# Patient Record
Sex: FEMALE | NOT HISPANIC OR LATINO | Employment: OTHER | URBAN - METROPOLITAN AREA
[De-identification: names, ages, dates, MRNs, and addresses within clinical notes are randomized per-mention and may not be internally consistent; named-entity substitution may affect disease eponyms.]

---

## 2019-09-23 NOTE — PROGRESS NOTES
Electrophysiology Clinic Consult     Mona Cordova  1950  [unfilled]  [unfilled]    09/25/19    DATE OF ADMISSION: (Not on file)  [facility] CARDIOLOGY    Simeon Serrano MD  [address] / [place] [address]    Chief Complaint   Patient presents with   • Chest Pain     Problem List:  1. Tachycardia (Atrial flutter vs SVT)  a. Echocardiogram 8/29/18: EF 60-65%, trace MR, mild TR  b. Stress test 8/29/18: No evidence of ischemia, EF 61%  c. [employer] loop recorder implant, 10/2018, Dr. Mckeon  d. Atrial flutter vs SVT detected on loop recorder interrogation 08/2019  e. CHADSVASc = 5 (female, age, hx of CVA, HTN), on Eliquis  2. Hypertension  3. Hyperlipidemia  4. Hypothyroidism  5. Hx of  Multiple CVA- first in 2017 with history TPA  (Cryptogenic Stroke) - with deficits of memory impairment and left sided weakness.   6. GERD  7. Asthma  8. Negative sleep study  9. Surgical history  a. Hysterectomy  b. Mastectomy  c. Gastric sleeve    History of Present Illness:  Patient is a 69 year old female who presents today in consultation by referral from Dr. Khan for further evaluation and management of atrial flutter.  The patient underwent an ILR in 2018 secondary to history of cryptogenic stroke.  When device was checked recently in Dr. Khan's office, there was concern for possible new onset atrial flutter on loop recorder interrogation.  Patient is mildly to moderately symptomatic with c/o regular palpitations, symptoms exacerbated by laying on left side and relieved by nothing in particular.  Symptoms occur at random times and usually last hours in duration. She has history of multiple cryptogenic CVAs and was placed on Plavix and ASA. Since diagnosis of atrial fibrillation/flutter, she has been on Eliquis without further CVA symptoms and/or bleeding. Her BP was not well controlled but medications were recently adjusted and it is now doing better. She has history of negative  sleep study. Thyroid is well controlled on replacement medication. No tobacco. She drinks wine or vodka on occasion. She drinks 3-4 cups of coffee daily.       Allergies   Allergen Reactions   • Morphine Rash and GI Intolerance        Cannot display prior to admission medications because the patient has not been admitted in this contact.            Current Outpatient Medications:   •  apixaban (ELIQUIS) 5 MG tablet tablet, Take 5 mg by mouth 2 (Two) Times a Day., Disp: , Rfl:   •  CALCIUM PO, Take 600 mg by mouth Daily., Disp: , Rfl:   •  cetirizine (zyrTEC) 10 MG tablet, Take 10 mg by mouth Daily., Disp: , Rfl:   •  Cyanocobalamin (VITAMIN B-12 PO), Take 1 tablet by mouth 2 (Two) Times a Day., Disp: , Rfl:   •  hydroCHLOROthiazide (HYDRODIURIL) 25 MG tablet, Take 25 mg by mouth Daily., Disp: , Rfl:   •  levothyroxine (SYNTHROID, LEVOTHROID) 100 MCG tablet, Take 100 mcg by mouth Daily., Disp: , Rfl:   •  metoprolol tartrate (LOPRESSOR) 100 MG tablet, Take 100 mg by mouth 2 (Two) Times a Day., Disp: , Rfl:   •  Multiple Vitamin (MULTI-VITAMIN PO), Take 1 tablet by mouth Daily. Centrum silver, Disp: , Rfl:   •  Multiple Vitamin (MULTI-VITAMIN PO), Take 1 tablet by mouth Daily. ICAPS VITAMIN, Disp: , Rfl:   •  pantoprazole (PROTONIX) 40 MG EC tablet, Take 40 mg by mouth 2 (Two) Times a Day., Disp: , Rfl:   •  QUEtiapine (SEROquel) 300 MG tablet, Take 300 mg by mouth Daily., Disp: , Rfl:   •  raloxifene (EVISTA) 60 MG tablet, Take 60 mg by mouth Daily., Disp: , Rfl:   •  raNITIdine (ZANTAC) 150 MG tablet, Take 150 mg by mouth Daily., Disp: , Rfl:     Social History     Socioeconomic History   • Marital status:      Spouse name: Not on file   • Number of children: Not on file   • Years of education: Not on file   • Highest education level: Not on file   Tobacco Use   • Smoking status: Never Smoker   • Smokeless tobacco: Never Used   Substance and Sexual Activity   • Alcohol use: Yes     Comment: maria isabel   • Drug  "use: No       Family History   Problem Relation Age of Onset   • Emphysema Mother    Father's sister: history of CAD with CABG  Father:  age 44, suicide  No siblings with heart disease.     REVIEW OF SYSTEMS:   CONST:  No weight loss, fever, chills, weakness + fatigue.   HEENT:  No visual loss, blurred vision, double vision, yellow sclerae.                   No hearing loss, congestion, sore throat.   SKIN:      No rashes, urticaria, ulcers, sores.     RESP:     No shortness of breath, hemoptysis, cough, sputum.   GI:           No anorexia, nausea, vomiting, diarrhea. No abdominal pain, melena.   :         No burning on urination, hematuria or increased frequency.  ENDO:    No diaphoresis, cold or heat intolerance. No polyuria or polydipsia.   NEURO:  No headache, dizziness, syncope, paralysis, ataxia, or parasthesias.                  No change in bowel or bladder control. + history of CVA/TIA  MUSC:    No muscle, back pain, joint pain or stiffness.   HEME:    No anemia, bleeding, bruising. No history of DVT/PE.  PSYCH:  No history of depression, anxiety    Vitals:    19 1037   BP: 122/86   BP Location: Right arm   Patient Position: Sitting   Pulse: 85   SpO2: 98%   Weight: 81.6 kg (180 lb)   Height: 175.3 cm (69\")                 Physical Exam:  GEN: Well nourished, well-developed, no acute distress  HEENT: Normocephalic, atraumatic, PERRLA, moist mucous membranes  NECK: Supple, NO JVD, no thyromegaly, no lymphadenopathy  CARD: S1S2, RRR, no murmur, gallop, rub, PMI NL  LUNGS: Clear to auscultation, normal respiratory effort  ABDOMEN: Soft, nontender, normal bowel sounds  EXTREMITIES: No gross deformities, no clubbing, cyanosis, or edema  SKIN: Warm, dry, No lesions  NEURO: No focal deficits, alert and oriented x 3  PSYCHIATRIC: Normal affect and mood      I personally viewed and interpreted the patient's EKG/Telemetry/lab data    No results found for: GLUCOSE, CALCIUM, NA, K, CO2, CL, BUN, CREATININE, " EGFRIFAFRI, EGFRIFNONA, BCR, ANIONGAP  No results found for: WBC, HGB, HCT, MCV, PLT  No results found for: INR, PROTIME  No results found for: TSH, Q7BTGBN, Q5STOIX, THYROIDAB    Loop recorder interrogation from Dr. Khan's office: tachycardia noted (SVT vs atrial flutter 150 bpm)  Loop recorder interrogation in our office today: no arrhythmias. Changed tachy rate to 120 bpm.       ECG 12 Lead  Date/Time: 9/25/2019 11:20 AM  Performed by: Anup Garcia MD  Authorized by: Anup Garcia MD   Previous ECG: no previous ECG available  Rhythm: sinus rhythm  BPM: 85                ICD-10-CM ICD-9-CM   1. Atrial flutter, unspecified type (CMS/HCC) I48.92 427.32   2. Essential hypertension I10 401.9   3. History of CVA (cerebrovascular accident) Z86.73 V12.54       Assessment and Plan:   1. Atrial flutter vs SVT:  - noted on device interrogation. In setting of cryptogenic CVA history, agree with Eliquis in place of Plavix/ASA. CHADSVasc 5.   - There is suspicion that she may be having Atrial flutter vs SVT causing her symptoms and possibly a cause of her CVA. However, the frequency is very low according to device interrogation. On today's interrogation, there were no arrhythmias. We did make an adjustment on her loop recorder to change tachy rate to 120 bpm. If there appears to be a large amount of arrhythmias, would recommend EPS +/- RFA of Atrial flutter vs SVT. Will monitor loop recorder over the next 3 months.      2. HTN:  - controlled on current medications    3. History of CVAs:  - no recurrences on Eliquis      Follow up in 2 months in [place]    Scribed for Anup Garcia MD by Lakshmi Rossi PA-C. 9/25/2019  11:21 AM     Anup DEJESUS MD, personally performed the services described in this documentation as scribed by the above named individual in my presence, and it is both accurate and complete.  9/25/2019  11:25 AM

## 2019-09-24 PROBLEM — I10 ESSENTIAL HYPERTENSION: Status: ACTIVE | Noted: 2019-09-24

## 2019-09-24 PROBLEM — E78.5 HYPERLIPIDEMIA: Status: ACTIVE | Noted: 2019-09-24

## 2019-09-24 PROBLEM — Z86.73 HISTORY OF CVA (CEREBROVASCULAR ACCIDENT): Status: ACTIVE | Noted: 2019-09-24

## 2019-09-24 PROBLEM — E03.9 HYPOTHYROIDISM: Status: ACTIVE | Noted: 2019-09-24

## 2019-09-24 PROBLEM — I48.92 ATRIAL FLUTTER (HCC): Status: ACTIVE | Noted: 2019-09-24

## 2019-09-25 ENCOUNTER — CONSULT (OUTPATIENT)
Dept: CARDIOLOGY | Facility: CLINIC | Age: 69
End: 2019-09-25

## 2019-09-25 VITALS
DIASTOLIC BLOOD PRESSURE: 86 MMHG | SYSTOLIC BLOOD PRESSURE: 122 MMHG | WEIGHT: 180 LBS | HEIGHT: 69 IN | OXYGEN SATURATION: 98 % | HEART RATE: 85 BPM | BODY MASS INDEX: 26.66 KG/M2

## 2019-09-25 DIAGNOSIS — Z86.73 HISTORY OF CVA (CEREBROVASCULAR ACCIDENT): ICD-10-CM

## 2019-09-25 DIAGNOSIS — I10 ESSENTIAL HYPERTENSION: ICD-10-CM

## 2019-09-25 DIAGNOSIS — I48.92 ATRIAL FLUTTER, UNSPECIFIED TYPE (HCC): Primary | ICD-10-CM

## 2019-09-25 PROCEDURE — 99204 OFFICE O/P NEW MOD 45 MIN: CPT | Performed by: INTERNAL MEDICINE

## 2019-09-25 PROCEDURE — 93291 INTERROG DEV EVAL SCRMS IP: CPT | Performed by: INTERNAL MEDICINE

## 2019-09-25 RX ORDER — PANTOPRAZOLE SODIUM 40 MG/1
40 TABLET, DELAYED RELEASE ORAL 2 TIMES DAILY
COMMUNITY

## 2019-09-25 RX ORDER — RANITIDINE 150 MG/1
150 TABLET ORAL DAILY
COMMUNITY
Start: 2019-08-08 | End: 2020-06-03

## 2019-09-25 RX ORDER — HYDROCHLOROTHIAZIDE 25 MG/1
25 TABLET ORAL DAILY
COMMUNITY
End: 2020-01-02

## 2019-09-25 RX ORDER — CETIRIZINE HYDROCHLORIDE 10 MG/1
10 TABLET ORAL DAILY
COMMUNITY

## 2019-09-25 RX ORDER — METOPROLOL TARTRATE 50 MG/1
50 TABLET, FILM COATED ORAL 2 TIMES DAILY
COMMUNITY
End: 2020-04-10 | Stop reason: SDUPTHER

## 2019-09-25 RX ORDER — RALOXIFENE HYDROCHLORIDE 60 MG/1
60 TABLET, FILM COATED ORAL DAILY
COMMUNITY
Start: 2019-09-09

## 2019-09-25 RX ORDER — LEVOTHYROXINE SODIUM 137 UG/1
137 CAPSULE ORAL DAILY
COMMUNITY
Start: 2019-09-21

## 2019-09-25 RX ORDER — QUETIAPINE FUMARATE 200 MG/1
200 TABLET, FILM COATED ORAL NIGHTLY
COMMUNITY
Start: 2019-07-27

## 2020-01-02 ENCOUNTER — OFFICE VISIT (OUTPATIENT)
Dept: CARDIOLOGY | Facility: CLINIC | Age: 70
End: 2020-01-02

## 2020-01-02 VITALS
SYSTOLIC BLOOD PRESSURE: 123 MMHG | HEIGHT: 69 IN | DIASTOLIC BLOOD PRESSURE: 85 MMHG | BODY MASS INDEX: 27.52 KG/M2 | HEART RATE: 87 BPM | WEIGHT: 185.8 LBS | OXYGEN SATURATION: 98 %

## 2020-01-02 DIAGNOSIS — R07.2 PRECORDIAL CHEST PAIN: ICD-10-CM

## 2020-01-02 DIAGNOSIS — I48.92 ATRIAL FLUTTER, UNSPECIFIED TYPE (HCC): ICD-10-CM

## 2020-01-02 DIAGNOSIS — I10 ESSENTIAL HYPERTENSION: ICD-10-CM

## 2020-01-02 DIAGNOSIS — I48.0 PAROXYSMAL ATRIAL FIBRILLATION (HCC): Primary | ICD-10-CM

## 2020-01-02 DIAGNOSIS — E78.2 MIXED HYPERLIPIDEMIA: ICD-10-CM

## 2020-01-02 DIAGNOSIS — Z95.818 OTHER SPECIFIED CARDIAC DEVICE IN SITU: ICD-10-CM

## 2020-01-02 PROCEDURE — 99214 OFFICE O/P EST MOD 30 MIN: CPT | Performed by: SPECIALIST

## 2020-01-02 RX ORDER — VILAZODONE HYDROCHLORIDE 40 MG/1
40 TABLET ORAL DAILY
COMMUNITY

## 2020-01-02 RX ORDER — RIFAXIMIN 550 MG/1
550 TABLET ORAL AS NEEDED
COMMUNITY
Start: 2019-11-19

## 2020-01-02 RX ORDER — SUCRALFATE 1 G/1
1 TABLET ORAL 2 TIMES DAILY
COMMUNITY

## 2020-01-02 RX ORDER — LOSARTAN POTASSIUM AND HYDROCHLOROTHIAZIDE 25; 100 MG/1; MG/1
1 TABLET ORAL DAILY
COMMUNITY
Start: 2020-01-01 | End: 2020-04-10 | Stop reason: SDUPTHER

## 2020-01-02 NOTE — PATIENT INSTRUCTIONS
Atrial Fibrillation  Atrial fibrillation is a type of irregular or rapid heartbeat (arrhythmia). In atrial fibrillation, the top part of the heart (atria) quivers in a chaotic pattern. This makes the heart unable to pump blood normally. Having atrial fibrillation can increase your risk for other health problems, such as:  · Blood can pool in the atria and form clots. If a clot travels to the brain, it can cause a stroke.  · The heart muscle may weaken from the irregular blood flow. This can cause heart failure.  Atrial fibrillation may start suddenly and stop on its own, or it may become a long-lasting problem.  What are the causes?  This condition is caused by some heart-related conditions or procedures, including:  · High blood pressure. This is the most common cause.  · Heart failure.  · Heart valve conditions.  · Inflammation of the sac that surrounds the heart (pericarditis).  · Heart surgery.  · Coronary artery disease.  · Certain heart rhythm disorders, such as Ruffin-Parkinson-White syndrome.  Other causes include:  · Pneumonia.  · Obstructive sleep apnea.  · Lung cancer.  · Thyroid problems, especially if the thyroid is overactive (hyperthyroidism).  · Excessive alcohol or drug use.  Sometimes, the cause of this condition is not known.  What increases the risk?  This condition is more likely to develop in:  · Older people.  · People who smoke.  · People who have diabetes mellitus.  · People who are overweight (obese).  · Athletes who exercise vigorously.  · People who have a family history.  What are the signs or symptoms?  Symptoms of this condition include:  · A feeling that your heart is beating rapidly or irregularly.  · A feeling of discomfort or pain in your chest.  · Shortness of breath.  · Sudden light-headedness or weakness.  · Getting tired easily during exercise.  In some cases, there are no symptoms.  How is this diagnosed?  Your health care provider may be able to detect atrial fibrillation when  taking your pulse. If detected, this condition may be diagnosed with:  · Electrocardiogram (ECG).  · Ambulatory cardiac monitor. This device records your heartbeats for 24 hours or more.  · Transthoracic echocardiogram (TTE) to evaluate how blood flows through your heart.  · Transesophageal echocardiogram (CHANG) to view more detailed images of your heart.  · A stress test.  · Imaging tests, such as a CT scan or chest X-ray.  · Blood tests.  How is this treated?  This condition may be treated with:  · Medicines to slow down the heart rate or bring the heart's rhythm back to normal.  · Medicines to prevent blood clots from forming.  · Electrical cardioversion. This delivers a low-energy shock to the heart to reset its rhythm.  · Ablation. This procedure destroys the part of the heart tissue that sends abnormal signals.  · Left atrial appendage occlusion/excision. This seals off a common place in the atria where blood clots can form (left atrial appendage).  The goal of treatment is to prevent blood clots from forming and to keep your heart beating at a normal rate and rhythm. Treatment depends on underlying medical conditions and how you feel when you are experiencing fibrillation.  Follow these instructions at home:  Medicines  · Take over-the counter and prescription medicines only as told by your health care provider.  · If your health care provider prescribed a blood-thinning medicine (anticoagulant), take it exactly as told. Taking too much blood-thinning medicine can cause bleeding. Taking too little can enable a blood clot to form and travel to the brain, causing a stroke.  Lifestyle         · Do not use any products that contain nicotine or tobacco, such as cigarettes and e-cigarettes. If you need help quitting, ask your health care provider.  · Do not drink beverages that contain caffeine, such as coffee, soda, and tea.  · Follow diet instructions as told by your health care provider.  · Exercise regularly as  "told by your health care provider.  · Do not drink alcohol.  General instructions  · If you have obstructive sleep apnea, manage your condition as told by your health care provider.  · Maintain a healthy weight. Do not use diet pills unless your health care provider approves. Diet pills may make heart problems worse.  · Keep all follow-up visits as told by your health care provider. This is important.  Contact a health care provider if you:  · Notice a change in the rate, rhythm, or strength of your heartbeat.  · Are taking an anticoagulant and you notice increased bruising.  · Tire more easily when you exercise or exert yourself.  · Have a sudden change in weight.  Get help right away if you have:    · Chest pain, abdominal pain, sweating, or weakness.  · Difficulty breathing.  · Blood in your vomit, stool (feces), or urine.  · Any symptoms of a stroke. \"BE FAST\" is an easy way to remember the main warning signs of a stroke:  ? B - Balance. Signs are dizziness, sudden trouble walking, or loss of balance.  ? E - Eyes. Signs are trouble seeing or a sudden change in vision.  ? F - Face. Signs are sudden weakness or numbness of the face, or the face or eyelid drooping on one side.  ? A - Arms. Signs are weakness or numbness in an arm. This happens suddenly and usually on one side of the body.  ? S - Speech. Signs are sudden trouble speaking, slurred speech, or trouble understanding what people say.  ? T - Time. Time to call emergency services. Write down what time symptoms started.  · Other signs of a stroke, such as:  ? A sudden, severe headache with no known cause.  ? Nausea or vomiting.  ? Seizure.  These symptoms may represent a serious problem that is an emergency. Do not wait to see if the symptoms will go away. Get medical help right away. Call your local emergency services (911 in the [place] Do not drive yourself to the hospital.  Summary  · Atrial fibrillation is a type of irregular or rapid heartbeat " "(arrhythmia).  · Symptoms include a feeling that your heart is beating fast or irregularly. In some cases, you may not have symptoms.  · The condition is treated with medicines to slow down the heart rate or bring the heart's rhythm back to normal. You may also need blood-thinning medicines to prevent blood clots.  · Get help right away if you have symptoms or signs of a stroke.  This information is not intended to replace advice given to you by your health care provider. Make sure you discuss any questions you have with your health care provider.  Document Released: 12/18/2006 Document Revised: 02/08/2019 Document Reviewed: 02/08/2019  [employer] Interactive Patient Education © 2019 [employer]  BMI for Adults    Body mass index (BMI) is a number that is calculated from a person's weight and height. BMI may help to estimate how much of a person's weight is composed of fat. BMI can help identify those who may be at higher risk for certain medical problems.  How is BMI used with adults?  BMI is used as a screening tool to identify possible weight problems. It is used to check whether a person is obese, overweight, healthy weight, or underweight.  How is BMI calculated?  BMI measures your weight and compares it to your height. This can be done either in English ([place] or metric measurements. Note that charts are available to help you find your BMI quickly and easily without having to do these calculations yourself.  To calculate your BMI in English ([place] measurements, your health care provider will:  1. Measure your weight in pounds (lb).  2. Multiply the number of pounds by 703.  ? For example, for a person who weighs 180 lb, multiply that number by 703, which equals 126,540.  3. Measure your height in inches (in). Then multiply that number by itself to get a measurement called \"inches squared.\"  ? For example, for a person who is 70 in tall, the \"inches squared\" measurement is 70 in x 70 in, which equals 4900 " "inches squared.  4. Divide the total from Step 2 (number of lb x 703) by the total from Step 3 (inches squared): 126,540 ÷ 4900 = 25.8. This is your BMI.  To calculate your BMI in metric measurements, your health care provider will:  1. Measure your weight in kilograms (kg).  2. Measure your height in meters (m). Then multiply that number by itself to get a measurement called \"meters squared.\"  ? For example, for a person who is 1.75 m tall, the \"meters squared\" measurement is 1.75 m x 1.75 m, which is equal to 3.1 meters squared.  3. Divide the number of kilograms (your weight) by the meters squared number. In this example: 70 ÷ 3.1 = 22.6. This is your BMI.  How is BMI interpreted?  To interpret your results, your health care provider will use BMI charts to identify whether you are underweight, normal weight, overweight, or obese. The following guidelines will be used:  · Underweight: BMI less than 18.5.  · Normal weight: BMI between 18.5 and 24.9.  · Overweight: BMI between 25 and 29.9.  · Obese: BMI of 30 and above.  Please note:  · Weight includes both fat and muscle, so someone with a muscular build, such as an athlete, may have a BMI that is higher than 24.9. In cases like these, BMI is not an accurate measure of body fat.  · To determine if excess body fat is the cause of a BMI of 25 or higher, further assessments may need to be done by a health care provider.  · BMI is usually interpreted in the same way for men and women.  Why is BMI a useful tool?  BMI is useful in two ways:  · Identifying a weight problem that may be related to a medical condition, or that may increase the risk for medical problems.  · Promoting lifestyle and diet changes in order to reach a healthy weight.  Summary  · Body mass index (BMI) is a number that is calculated from a person's weight and height.  · BMI may help to estimate how much of a person's weight is composed of fat. BMI can help identify those who may be at higher risk " for certain medical problems.  · BMI can be measured using English measurements or metric measurements.  · To interpret your results, your health care provider will use BMI charts to identify whether you are underweight, normal weight, overweight, or obese.  This information is not intended to replace advice given to you by your health care provider. Make sure you discuss any questions you have with your health care provider.  Document Released: 08/29/2005 Document Revised: 10/31/2018 Document Reviewed: 10/31/2018  [employer] Interactive Patient Education © 2019 [employer]

## 2020-01-02 NOTE — PROGRESS NOTES
Subjective     Mona Cordova is a 69 y.o. female who presents to day for Follow-up (patient appears in office today for follow up); Hypertension; and Atrial Flutter.    CHIEF COMPLIANT  Chief Complaint   Patient presents with   • Follow-up     patient appears in office today for follow up   • Hypertension   • Atrial Flutter       Active Problems:  Problem List Items Addressed This Visit        Cardiovascular and Mediastinum    Essential hypertension    Relevant Medications    losartan-hydrochlorothiazide (HYZAAR) 100-25 MG per tablet    Atrial flutter (CMS/HCC)    Hyperlipidemia    Paroxysmal atrial fibrillation (CMS/HCC) - Primary    Other specified cardiac device in situ    Overview     Loop monitor           Other Visit Diagnoses     Precordial chest pain              HPI  Mrs. Iglesias has been doing well she denied any recent palpitations she does however have some shortness of breath on exertion but this has been stable she noticed also she occasionally gets brief retrosternal chest pressure usually less than a minute or so at rest not on exertion she has been active and denies any other symptoms      PRIOR MEDS  Current Outpatient Medications on File Prior to Visit   Medication Sig Dispense Refill   • apixaban (ELIQUIS) 5 MG tablet tablet Take 5 mg by mouth 2 (Two) Times a Day.     • CALCIUM PO Take 600 mg by mouth Daily.     • cetirizine (zyrTEC) 10 MG tablet Take 10 mg by mouth Daily.     • Cyanocobalamin (VITAMIN B-12 PO) Take 1 tablet by mouth Daily.     • levothyroxine (SYNTHROID, LEVOTHROID) 100 MCG tablet Take 100 mcg by mouth Daily.     • losartan-hydrochlorothiazide (HYZAAR) 100-25 MG per tablet Take 1 tablet by mouth Daily.     • metoprolol tartrate (LOPRESSOR) 100 MG tablet Take 50 mg by mouth 2 (Two) Times a Day.     • Multiple Vitamin (MULTI-VITAMIN PO) Take 1 tablet by mouth Daily. Centrum silver     • Multiple Vitamin (MULTI-VITAMIN PO) Take 1 tablet by mouth Daily. ICAPS VITAMIN     •  pantoprazole (PROTONIX) 40 MG EC tablet Take 40 mg by mouth 2 (Two) Times a Day.     • QUEtiapine (SEROquel) 300 MG tablet Take 300 mg by mouth Daily.     • raloxifene (EVISTA) 60 MG tablet Take 60 mg by mouth Daily.     • raNITIdine (ZANTAC) 150 MG tablet Take 150 mg by mouth Daily.     • sucralfate (CARAFATE) 1 g tablet Take 1 g by mouth 2 (Two) Times a Day.     • vilazodone (VIIBRYD) 40 MG tablet tablet Take 40 mg by mouth Daily.     • XIFAXAN 550 MG tablet Take 550 mg by mouth As Needed.     • [DISCONTINUED] hydroCHLOROthiazide (HYDRODIURIL) 25 MG tablet Take 25 mg by mouth Daily.       No current facility-administered medications on file prior to visit.        ALLERGIES  Morphine    HISTORY  Past Medical History:   Diagnosis Date   • Acid reflux    • Atrial fibrillation (CMS/HCC)    • Cancer (CMS/HCC)     BREAST   • Colitis    • Depression    • Depression    • Hypertension    • Stroke (CMS/HCC)    • Thyroid disorder        Social History     Socioeconomic History   • Marital status:      Spouse name: Not on file   • Number of children: Not on file   • Years of education: Not on file   • Highest education level: Not on file   Tobacco Use   • Smoking status: Never Smoker   • Smokeless tobacco: Never Used   Substance and Sexual Activity   • Alcohol use: Yes     Frequency: Monthly or less     Comment: maria isabel   • Drug use: No   • Sexual activity: Defer       Family History   Problem Relation Age of Onset   • Emphysema Mother        Review of Systems   Constitutional: Positive for fatigue (easily fatigued).   HENT: Positive for congestion (head congestion from allergies) and rhinorrhea (runny nose from allergies). Negative for sneezing.    Eyes: Positive for visual disturbance (wears glasses daily).   Respiratory: Positive for chest tightness (occasional chest tightness) and shortness of breath (easily SOA; worse on exertion). Negative for cough and wheezing.    Cardiovascular: Positive for chest pain  "(occasional precordial pain) and leg swelling (BLE swelling/edema daily). Negative for palpitations.   Gastrointestinal: Negative.  Negative for abdominal pain, nausea and vomiting.   Endocrine: Negative.  Negative for cold intolerance and heat intolerance.   Genitourinary: Negative.  Negative for difficulty urinating, frequency and urgency.   Musculoskeletal: Positive for arthralgias (joints), back pain (back pain from arthritis) and neck pain (neck pain from arthritis). Negative for neck stiffness.   Skin: Negative.  Negative for rash and wound.   Allergic/Immunologic: Positive for environmental allergies (seasonal allergies). Negative for food allergies.   Neurological: Negative.  Negative for dizziness, syncope, weakness and light-headedness.   Hematological: Bruises/bleeds easily (bruises and bleeds easily).   Psychiatric/Behavioral: Negative.  Negative for agitation, confusion and sleep disturbance (denies waking up smothering/SOA). The patient is not nervous/anxious.        Objective     VITALS: /85 (BP Location: Left arm, Patient Position: Sitting)   Pulse 87   Ht 175.3 cm (69\")   Wt 84.3 kg (185 lb 12.8 oz)   SpO2 98%   BMI 27.44 kg/m²     LABS:   Lab Results (most recent)     None          IMAGING:   No Images in the past 120 days found..    EXAM:  Physical Exam   Constitutional: She is oriented to person, place, and time. She appears well-developed and well-nourished.   HENT:   Head: Normocephalic and atraumatic.   Eyes: Pupils are equal, round, and reactive to light.   Neck: Neck supple. No JVD present. No thyromegaly present.   Cardiovascular: Normal rate, regular rhythm, normal heart sounds and intact distal pulses. Exam reveals no gallop and no friction rub.   No murmur heard.  Pulmonary/Chest: Effort normal and breath sounds normal. No stridor. No respiratory distress. She has no wheezes. She has no rales. She exhibits no tenderness.   Musculoskeletal: She exhibits no edema, tenderness or " deformity.   Neurological: She is alert and oriented to person, place, and time. No cranial nerve deficit. Coordination normal.   Skin: Skin is warm and dry.   Loop monitor in situ   Psychiatric: She has a normal mood and affect.   Vitals reviewed.      Procedure   Procedures       Assessment/Plan    Diagnosis Plan   1. Paroxysmal atrial fibrillation (CMS/HCC)     2. Essential hypertension     3. Atrial flutter, unspecified type (CMS/HCC)     4. Mixed hyperlipidemia     5. Other specified cardiac device in situ     6. Precordial chest pain     1.  Paroxysmal atrial fibrillation: Patient has been doing well there is no further palpitations will see Dr. Garcia next week and expected to have her loop monitor interrogated  2.  Essential hypertension: Her blood pressure has been well controlled we will continue with the current management  3.  Atrial flutter nonspecific: Again Loop monitor will be reassessed and she is asymptomatic we will continue the current management  4.  Mixed hyperlipidemia: No labs available patient had recent labs at her primary care physician we will try to get lab results  5.  Other specific cardiac device in situ ( loop monitor): This will be interrogated  6.  Precordial chest pain: Is atypical patient had relatively recent stress test and that showed no ischemia if the pain continues to getting any worse or become exertion will consider cardiac catheterization    Return in about 4 months (around 5/2/2020) for Lab results from Dr Serrano.    Mona was seen today for follow-up, hypertension and atrial flutter.    Diagnoses and all orders for this visit:    Paroxysmal atrial fibrillation (CMS/HCC)    Essential hypertension    Atrial flutter, unspecified type (CMS/HCC)    Mixed hyperlipidemia    Other specified cardiac device in situ    Precordial chest pain        Patient's Body mass index is 27.44 kg/m². BMI is above normal parameters. Recommendations include: educational material and referral  to primary care.           MEDS ORDERED DURING VISIT:  No orders of the defined types were placed in this encounter.        This document has been electronically signed by Francesco Khan MD  January 2, 2020 4:58 PM

## 2020-01-10 ENCOUNTER — OFFICE VISIT (OUTPATIENT)
Dept: CARDIOLOGY | Facility: CLINIC | Age: 70
End: 2020-01-10

## 2020-01-10 VITALS
HEIGHT: 69 IN | HEART RATE: 79 BPM | BODY MASS INDEX: 27.4 KG/M2 | DIASTOLIC BLOOD PRESSURE: 70 MMHG | WEIGHT: 185 LBS | SYSTOLIC BLOOD PRESSURE: 111 MMHG

## 2020-01-10 DIAGNOSIS — I48.0 PAROXYSMAL ATRIAL FIBRILLATION (HCC): ICD-10-CM

## 2020-01-10 DIAGNOSIS — I10 ESSENTIAL HYPERTENSION: ICD-10-CM

## 2020-01-10 DIAGNOSIS — I47.1 PAROXYSMAL SVT (SUPRAVENTRICULAR TACHYCARDIA) (HCC): Primary | ICD-10-CM

## 2020-01-10 DIAGNOSIS — Z86.73 HISTORY OF CVA (CEREBROVASCULAR ACCIDENT): ICD-10-CM

## 2020-01-10 PROCEDURE — 99213 OFFICE O/P EST LOW 20 MIN: CPT | Performed by: INTERNAL MEDICINE

## 2020-01-10 PROCEDURE — 93291 INTERROG DEV EVAL SCRMS IP: CPT | Performed by: INTERNAL MEDICINE

## 2020-01-10 NOTE — PROGRESS NOTES
Mona Cordova  1950  [phone]    01/10/2020    [facility] CARDIOLOGY     Simeon Serrano MD  [address]  [place] [address]    Chief Complaint   Patient presents with   • Atrial Fibrillation       Problem List:   1. Tachycardia (Atrial flutter vs SVT)  a. Echocardiogram 8/29/18: EF 60-65%, trace MR, mild TR  b. Stress test 8/29/18: No evidence of ischemia, EF 61%  c. [employer] loop recorder implant, 10/2018, Dr. Mckeon  d. Atrial flutter vs SVT detected on loop recorder interrogation 08/2019  e. CHADSVASc = 5 (female, age, hx of CVA, HTN), on Eliquis  2. Hypertension  3. Hyperlipidemia  4. Hypothyroidism  5. Hx of  Multiple CVA- first in 2017 with history TPA  (Cryptogenic Stroke) - with deficits of memory impairment and left sided weakness.   6. GERD  7. Asthma  8. Negative sleep study  9. Surgical history  a. Hysterectomy  b. Mastectomy  c. Gastric sleeve  Allergies  Allergies   Allergen Reactions   • Morphine Rash and GI Intolerance       Current Medications    Current Outpatient Medications:   •  apixaban (ELIQUIS) 5 MG tablet tablet, Take 5 mg by mouth 2 (Two) Times a Day., Disp: , Rfl:   •  CALCIUM PO, Take 600 mg by mouth Daily., Disp: , Rfl:   •  cetirizine (zyrTEC) 10 MG tablet, Take 10 mg by mouth Daily., Disp: , Rfl:   •  Cyanocobalamin (VITAMIN B-12 PO), Take 1 tablet by mouth Daily., Disp: , Rfl:   •  levothyroxine (SYNTHROID, LEVOTHROID) 100 MCG tablet, Take 100 mcg by mouth Daily., Disp: , Rfl:   •  losartan-hydrochlorothiazide (HYZAAR) 100-25 MG per tablet, Take 1 tablet by mouth Daily., Disp: , Rfl:   •  metoprolol tartrate (LOPRESSOR) 100 MG tablet, Take 50 mg by mouth 2 (Two) Times a Day., Disp: , Rfl:   •  Multiple Vitamin (MULTI-VITAMIN PO), Take 1 tablet by mouth Daily. Centrum silver, Disp: , Rfl:   •  Multiple Vitamin (MULTI-VITAMIN PO), Take 1 tablet by mouth Daily. ICAPS VITAMIN, Disp: , Rfl:   •  pantoprazole (PROTONIX) 40 MG EC tablet, Take  "40 mg by mouth 2 (Two) Times a Day., Disp: , Rfl:   •  QUEtiapine (SEROquel) 300 MG tablet, Take 300 mg by mouth Daily., Disp: , Rfl:   •  raloxifene (EVISTA) 60 MG tablet, Take 60 mg by mouth Daily., Disp: , Rfl:   •  raNITIdine (ZANTAC) 150 MG tablet, Take 150 mg by mouth Daily., Disp: , Rfl:   •  sucralfate (CARAFATE) 1 g tablet, Take 1 g by mouth 2 (Two) Times a Day., Disp: , Rfl:   •  vilazodone (VIIBRYD) 40 MG tablet tablet, Take 40 mg by mouth Daily., Disp: , Rfl:   •  XIFAXAN 550 MG tablet, Take 550 mg by mouth As Needed., Disp: , Rfl:     History of Present Illness     Pt presents for follow up of CVA/HTN/SVT . Since we last saw the pt, pt denies any sustained palps, SOB, CP, LH, and dizziness. Denies any hospitalizations, ER visits, bleeding, or TIA/CVA symptoms. Overall feels well. BP has been better controlled recently after losartan. Still with mild memory isuses    ROS:  General:  + fatigue, No weight gain or loss  Cardiovascular:  Denies CP, PND, syncope, near syncope, edema or palpitations.  Pulmonary:  Denies GIFFORD, cough, or wheezing      Vitals:    01/10/20 1333   BP: 111/70   BP Location: Right arm   Patient Position: Sitting   Cuff Size: Adult   Pulse: 79   Weight: 83.9 kg (185 lb)   Height: 175.3 cm (69\")     Body mass index is 27.32 kg/m².  PE:  General: NAD  Neck: no JVD, no carotid bruits, no TM  Heart RRR, NL S1, S2, S4 present, no rubs, murmurs  Lungs: CTA, no wheezes, rhonchi, or rales  Abd: soft, non-tender, NL BS  Ext: No musculoskeletal deformities, no edema, cyanosis, or clubbing  Psych: normal mood and affect    Diagnostic Data:     ILR interrogation: short SVT seconds no bradycardia    Procedures    1. Paroxysmal SVT (supraventricular tachycardia) (CMS/HCC)    2. Essential hypertension    3. Paroxysmal atrial fibrillation (CMS/HCC)    4. History of CVA (cerebrovascular accident)          Plan:  1) AFl vs SVT: minimally symptomatic with short (seconds) in duration. No changes for " now.  Continue present medications.   2) CVA/Anticoagulation  Continue NOAC  3) HTN  Wt loss, exercise, salt reduction    F/up in 6 months

## 2020-01-13 DIAGNOSIS — Z86.73 HISTORY OF CVA (CEREBROVASCULAR ACCIDENT): ICD-10-CM

## 2020-01-13 DIAGNOSIS — I48.92 ATRIAL FLUTTER, UNSPECIFIED TYPE (HCC): Primary | ICD-10-CM

## 2020-01-13 RX ORDER — APIXABAN 5 MG/1
TABLET, FILM COATED ORAL
Qty: 180 TABLET | Refills: 1 | Status: SHIPPED | OUTPATIENT
Start: 2020-01-13 | End: 2020-04-10 | Stop reason: SDUPTHER

## 2020-04-10 ENCOUNTER — OFFICE VISIT (OUTPATIENT)
Dept: CARDIOLOGY | Facility: CLINIC | Age: 70
End: 2020-04-10

## 2020-04-10 VITALS
BODY MASS INDEX: 27.11 KG/M2 | WEIGHT: 183 LBS | SYSTOLIC BLOOD PRESSURE: 103 MMHG | DIASTOLIC BLOOD PRESSURE: 69 MMHG | HEIGHT: 69 IN

## 2020-04-10 DIAGNOSIS — Z95.818 OTHER SPECIFIED CARDIAC DEVICE IN SITU: ICD-10-CM

## 2020-04-10 DIAGNOSIS — E78.2 MIXED HYPERLIPIDEMIA: ICD-10-CM

## 2020-04-10 DIAGNOSIS — I10 ESSENTIAL HYPERTENSION: Primary | ICD-10-CM

## 2020-04-10 DIAGNOSIS — I48.92 ATRIAL FLUTTER, UNSPECIFIED TYPE (HCC): ICD-10-CM

## 2020-04-10 DIAGNOSIS — I48.0 PAROXYSMAL ATRIAL FIBRILLATION (HCC): ICD-10-CM

## 2020-04-10 PROCEDURE — 99441 PR PHYS/QHP TELEPHONE EVALUATION 5-10 MIN: CPT | Performed by: SPECIALIST

## 2020-04-10 RX ORDER — METOPROLOL TARTRATE 50 MG/1
50 TABLET, FILM COATED ORAL 2 TIMES DAILY
Qty: 180 TABLET | Refills: 1 | Status: SHIPPED | OUTPATIENT
Start: 2020-04-10 | End: 2021-02-17

## 2020-04-10 RX ORDER — LOSARTAN POTASSIUM AND HYDROCHLOROTHIAZIDE 25; 100 MG/1; MG/1
1 TABLET ORAL DAILY
Qty: 90 TABLET | Refills: 1 | Status: SHIPPED | OUTPATIENT
Start: 2020-04-10 | End: 2021-02-17

## 2020-04-10 NOTE — PATIENT INSTRUCTIONS
"Fat and Cholesterol Restricted Eating Plan  Getting too much fat and cholesterol in your diet may cause health problems. Choosing the right foods helps keep your fat and cholesterol at normal levels. This can keep you from getting certain diseases.  Your doctor may recommend an eating plan that includes:  · Total fat: ______% or less of total calories a day.  · Saturated fat: ______% or less of total calories a day.  · Cholesterol: less than _________mg a day.  · Fiber: ______g a day.  What are tips for following this plan?  Meal planning  · At meals, divide your plate into four equal parts:  ? Fill one-half of your plate with vegetables and green salads.  ? Fill one-fourth of your plate with whole grains.  ? Fill one-fourth of your plate with low-fat (lean) protein foods.  · Eat fish that is high in omega-3 fats at least two times a week. This includes mackerel, tuna, sardines, and salmon.  · Eat foods that are high in fiber, such as whole grains, beans, apples, broccoli, carrots, peas, and barley.  General tips    · Work with your doctor to lose weight if you need to.  · Avoid:  ? Foods with added sugar.  ? Fried foods.  ? Foods with partially hydrogenated oils.  · Limit alcohol intake to no more than 1 drink a day for nonpregnant women and 2 drinks a day for men. One drink equals 12 oz of beer, 5 oz of wine, or 1½ oz of hard liquor.  Reading food labels  · Check food labels for:  ? Trans fats.  ? Partially hydrogenated oils.  ? Saturated fat (g) in each serving.  ? Cholesterol (mg) in each serving.  ? Fiber (g) in each serving.  · Choose foods with healthy fats, such as:  ? Monounsaturated fats.  ? Polyunsaturated fats.  ? Omega-3 fats.  · Choose grain products that have whole grains. Look for the word \"whole\" as the first word in the ingredient list.  Cooking  · Cook foods using low-fat methods. These include baking, boiling, grilling, and broiling.  · Eat more home-cooked foods. Eat at restaurants and buffets " less often.  · Avoid cooking using saturated fats, such as butter, cream, palm oil, palm kernel oil, and coconut oil.  Recommended foods    Fruits  · All fresh, canned (in natural juice), or frozen fruits.  Vegetables  · Fresh or frozen vegetables (raw, steamed, roasted, or grilled). Green salads.  Grains  · Whole grains, such as whole wheat or whole grain breads, crackers, cereals, and pasta. Unsweetened oatmeal, bulgur, barley, quinoa, or brown rice. Corn or whole wheat flour tortillas.  Meats and other protein foods  · Ground beef (85% or leaner), grass-fed beef, or beef trimmed of fat. Skinless chicken or turkey. Ground chicken or turkey. Pork trimmed of fat. All fish and seafood. Egg whites. Dried beans, peas, or lentils. Unsalted nuts or seeds. Unsalted canned beans. Nut butters without added sugar or oil.  Dairy  · Low-fat or nonfat dairy products, such as skim or 1% milk, 2% or reduced-fat cheeses, low-fat and fat-free ricotta or cottage cheese, or plain low-fat and nonfat yogurt.  Fats and oils  · Tub margarine without trans fats. Light or reduced-fat mayonnaise and salad dressings. Avocado. Olive, canola, sesame, or safflower oils.  The items listed above may not be a complete list of foods and beverages you can eat. Contact a dietitian for more information.  Foods to avoid  Fruits  · Canned fruit in heavy syrup. Fruit in cream or butter sauce. Fried fruit.  Vegetables  · Vegetables cooked in cheese, cream, or butter sauce. Fried vegetables.  Grains  · White bread. White pasta. White rice. Cornbread. Bagels, pastries, and croissants. Crackers and snack foods that contain trans fat and hydrogenated oils.  Meats and other protein foods  · Fatty cuts of meat. Ribs, chicken wings, solis, sausage, bologna, salami, chitterlings, fatback, hot dogs, bratwurst, and packaged lunch meats. Liver and organ meats. Whole eggs and egg yolks. Chicken and turkey with skin. Fried meat.  Dairy  · Whole or 2% milk, cream,  half-and-half, and cream cheese. Whole milk cheeses. Whole-fat or sweetened yogurt. Full-fat cheeses. Nondairy creamers and whipped toppings. Processed cheese, cheese spreads, and cheese curds.  Beverages  · Alcohol. Sugar-sweetened drinks such as sodas, lemonade, and fruit drinks.  Fats and oils  · Butter, stick margarine, lard, shortening, ghee, or solis fat. Coconut, palm kernel, and palm oils.  Sweets and desserts  · Corn syrup, sugars, honey, and molasses. Candy. Jam and jelly. Syrup. Sweetened cereals. Cookies, pies, cakes, donuts, muffins, and ice cream.  The items listed above may not be a complete list of foods and beverages you should avoid. Contact a dietitian for more information.  Summary  · Choosing the right foods helps keep your fat and cholesterol at normal levels. This can keep you from getting certain diseases.  · At meals, fill one-half of your plate with vegetables and green salads.  · Eat high-fiber foods, like whole grains, beans, apples, carrots, peas, and barley.  · Limit added sugar, saturated fats, alcohol, and fried foods.  This information is not intended to replace advice given to you by your health care provider. Make sure you discuss any questions you have with your health care provider.  Document Released: 06/18/2013 Document Revised: 08/21/2019 Document Reviewed: 09/04/2018  [employer] Interactive Patient Education © 2020 [employer]  BMI for Adults    Body mass index (BMI) is a number that is calculated from a person's weight and height. BMI may help to estimate how much of a person's weight is composed of fat. BMI can help identify those who may be at higher risk for certain medical problems.  How is BMI used with adults?  BMI is used as a screening tool to identify possible weight problems. It is used to check whether a person is obese, overweight, healthy weight, or underweight.  How is BMI calculated?  BMI measures your weight and compares it to your height. This can be done  "either in English ([place] or metric measurements. Note that charts are available to help you find your BMI quickly and easily without having to do these calculations yourself.  To calculate your BMI in English ([place] measurements, your health care provider will:  1. Measure your weight in pounds (lb).  2. Multiply the number of pounds by 703.  ? For example, for a person who weighs 180 lb, multiply that number by 703, which equals 126,540.  3. Measure your height in inches (in). Then multiply that number by itself to get a measurement called \"inches squared.\"  ? For example, for a person who is 70 in tall, the \"inches squared\" measurement is 70 in x 70 in, which equals 4900 inches squared.  4. Divide the total from Step 2 (number of lb x 703) by the total from Step 3 (inches squared): 126,540 ÷ 4900 = 25.8. This is your BMI.  To calculate your BMI in metric measurements, your health care provider will:  1. Measure your weight in kilograms (kg).  2. Measure your height in meters (m). Then multiply that number by itself to get a measurement called \"meters squared.\"  ? For example, for a person who is 1.75 m tall, the \"meters squared\" measurement is 1.75 m x 1.75 m, which is equal to 3.1 meters squared.  3. Divide the number of kilograms (your weight) by the meters squared number. In this example: 70 ÷ 3.1 = 22.6. This is your BMI.  How is BMI interpreted?  To interpret your results, your health care provider will use BMI charts to identify whether you are underweight, normal weight, overweight, or obese. The following guidelines will be used:  · Underweight: BMI less than 18.5.  · Normal weight: BMI between 18.5 and 24.9.  · Overweight: BMI between 25 and 29.9.  · Obese: BMI of 30 and above.  Please note:  · Weight includes both fat and muscle, so someone with a muscular build, such as an athlete, may have a BMI that is higher than 24.9. In cases like these, BMI is not an accurate measure of body fat.  · To determine " if excess body fat is the cause of a BMI of 25 or higher, further assessments may need to be done by a health care provider.  · BMI is usually interpreted in the same way for men and women.  Why is BMI a useful tool?  BMI is useful in two ways:  · Identifying a weight problem that may be related to a medical condition, or that may increase the risk for medical problems.  · Promoting lifestyle and diet changes in order to reach a healthy weight.  Summary  · Body mass index (BMI) is a number that is calculated from a person's weight and height.  · BMI may help to estimate how much of a person's weight is composed of fat. BMI can help identify those who may be at higher risk for certain medical problems.  · BMI can be measured using English measurements or metric measurements.  · To interpret your results, your health care provider will use BMI charts to identify whether you are underweight, normal weight, overweight, or obese.  This information is not intended to replace advice given to you by your health care provider. Make sure you discuss any questions you have with your health care provider.  Document Released: 08/29/2005 Document Revised: 10/31/2018 Document Reviewed: 10/31/2018  [employer] Interactive Patient Education © 2020 [employer]

## 2020-04-10 NOTE — PROGRESS NOTES
Subjective   Follow up, Palpitations  Mona Cordova is a 69 y.o. female who presents to day for Atrial Fibrillation (Telephone visit for a follow up ).    CHIEF COMPLIANT  Chief Complaint   Patient presents with   • Atrial Fibrillation     Telephone visit for a follow up      Problem list:    1. Tachycardia (Atrial flutter vs SVT)  a. Echocardiogram 8/29/18: EF 60-65%, trace MR, mild TR  b. Stress test 8/29/18: No evidence of ischemia, EF 61%  c. [employer] loop recorder implant, 10/2018, Dr. Mckeon  d. Atrial flutter vs SVT detected on loop recorder interrogation 08/2019  e. CHADSVASc = 5 (female, age, hx of CVA, HTN), on Eliquis  2. Hypertension  3. Hyperlipidemia  4. Hypothyroidism  5. Hx of  Multiple CVA- first in 2017 with history TPA  (Cryptogenic Stroke) - with deficits of memory impairment and left sided weakness.   6. GERD  7. Asthma  8. Negative sleep study      Active Problems:  Problem List Items Addressed This Visit        Cardiovascular and Mediastinum    Essential hypertension - Primary    Atrial flutter (CMS/HCC)    Hyperlipidemia    Paroxysmal atrial fibrillation (CMS/HCC)    Other specified cardiac device in situ    Overview     Loop monitor               HPI  You have chosen to receive care through a telephone visit today. Do you consent to use a telephone visit for your medical care today? Yes  She has been doing relatively well not palpitations except for rare situations and usually brief she has also brief chest discomfort at rest usually less than a minute not related to exertion she denied shortness of breath she had intermittent trace edema the lower extremity occasionally otherwise she denied any other symptom  PRIOR MEDS  Current Outpatient Medications on File Prior to Visit   Medication Sig Dispense Refill   • CALCIUM PO Take 600 mg by mouth Daily.     • cetirizine (zyrTEC) 10 MG tablet Take 10 mg by mouth Daily.     • Cyanocobalamin (VITAMIN B-12 PO) Take 1 tablet by mouth Daily.     •  ELIQUIS 5 MG tablet tablet TAKE 1 TABLET TWICE A  tablet 1   • levothyroxine (SYNTHROID, LEVOTHROID) 125 MCG tablet Take 125 mcg by mouth Daily.     • losartan-hydrochlorothiazide (HYZAAR) 100-25 MG per tablet Take 1 tablet by mouth Daily.     • metoprolol tartrate (LOPRESSOR) 50 MG tablet Take 50 mg by mouth 2 (Two) Times a Day.     • Multiple Vitamin (MULTI-VITAMIN PO) Take 1 tablet by mouth Daily. Centrum silver     • Multiple Vitamin (MULTI-VITAMIN PO) Take 1 tablet by mouth Daily. ICAPS VITAMIN     • pantoprazole (PROTONIX) 40 MG EC tablet Take 40 mg by mouth 2 (Two) Times a Day.     • QUEtiapine (SEROquel) 300 MG tablet Take 300 mg by mouth Daily.     • raloxifene (EVISTA) 60 MG tablet Take 60 mg by mouth Daily.     • raNITIdine (ZANTAC) 150 MG tablet Take 150 mg by mouth Daily.     • sucralfate (CARAFATE) 1 g tablet Take 1 g by mouth 2 (Two) Times a Day.     • vilazodone (VIIBRYD) 40 MG tablet tablet Take 40 mg by mouth Daily.     • XIFAXAN 550 MG tablet Take 550 mg by mouth As Needed.       No current facility-administered medications on file prior to visit.        ALLERGIES  Morphine    HISTORY  Past Medical History:   Diagnosis Date   • Acid reflux    • Atrial fibrillation (CMS/HCC)    • Cancer (CMS/HCC)     BREAST   • Colitis    • Depression    • Depression    • Hypertension    • Stroke (CMS/HCC)    • Thyroid disorder        Social History     Socioeconomic History   • Marital status:      Spouse name: Not on file   • Number of children: Not on file   • Years of education: Not on file   • Highest education level: Not on file   Tobacco Use   • Smoking status: Never Smoker   • Smokeless tobacco: Never Used   Substance and Sexual Activity   • Alcohol use: Yes     Frequency: Monthly or less     Comment: maria isabel   • Drug use: No   • Sexual activity: Defer       Family History   Problem Relation Age of Onset   • Emphysema Mother        Review of Systems   Constitutional: Positive for fatigue.  "Negative for chills and fever.   HENT: Positive for congestion (seasonal ).    Eyes: Positive for visual disturbance (glasses daily).   Respiratory: Positive for shortness of breath (with moderate exertion ). Negative for chest tightness.    Cardiovascular: Positive for leg swelling (LE edema which usually resolves overnight). Negative for chest pain and palpitations.   Gastrointestinal: Negative.  Negative for abdominal distention and abdominal pain.   Endocrine: Positive for cold intolerance. Negative for heat intolerance.   Genitourinary: Negative.  Negative for flank pain.   Musculoskeletal: Positive for arthralgias (joints). Negative for back pain.   Skin: Negative.  Negative for rash and wound.   Allergic/Immunologic: Negative.  Negative for environmental allergies and food allergies.   Neurological: Negative.  Negative for dizziness, weakness and light-headedness.   Hematological: Bruises/bleeds easily.   Psychiatric/Behavioral: Negative.  Negative for sleep disturbance (denies waking with smothering ).       Objective     VITALS: /69 (BP Location: Right arm, Patient Position: Sitting) Comment: pt reported  Ht 175.3 cm (69\")   Wt 83 kg (183 lb) Comment: pt reported  BMI 27.02 kg/m²     LABS:   Lab Results (most recent)     None          IMAGING:   No Images in the past 120 days found..    EXAM:  Physical Exam  No physical exam as this is phone consultation  Procedure   Procedures       Assessment/Plan    Diagnosis Plan   1. Essential hypertension     2. Atrial flutter, unspecified type (CMS/HCC)     3. Mixed hyperlipidemia     4. Paroxysmal atrial fibrillation (CMS/HCC)     5. Other specified cardiac device in situ     1.  Her blood pressure is well controlled we will continue with the current management  2.  I do not have any recent labs but she did have recent labs at her PCP office at the time her potassium was low apparently this was later on corrected she states she was told that her lipids are " well controlled we will try to get the report  3.  We will interrogate the loop monitor for the episodes of atrial fibrillation will consider anti-adding antiarrhythmic agent if she has frequent episodes will continue with anticoagulation  4.  Chest pain is atypical she has a relatively recent stress test which showed no ischemia we will continue current management  I will see her back in 6-month but will try to interrogate the loop monitor soon    No follow-ups on file.    Mona was seen today for atrial fibrillation.    Diagnoses and all orders for this visit:    Essential hypertension    Atrial flutter, unspecified type (CMS/HCC)    Mixed hyperlipidemia    Paroxysmal atrial fibrillation (CMS/HCC)    Other specified cardiac device in situ              This visit has been rescheduled as a phone visit to comply with patient safety concerns in accordance with CDC recommendations. Total time of discussion was 7 minutes.        MEDS ORDERED DURING VISIT:  No orders of the defined types were placed in this encounter.        This document has been electronically signed by Francesco Khan MD  April 10, 2020 13:13

## 2020-04-17 ENCOUNTER — OFFICE VISIT (OUTPATIENT)
Dept: CARDIOLOGY | Facility: CLINIC | Age: 70
End: 2020-04-17

## 2020-04-17 DIAGNOSIS — I48.0 PAROXYSMAL ATRIAL FIBRILLATION (HCC): ICD-10-CM

## 2020-04-17 DIAGNOSIS — I48.92 ATRIAL FLUTTER, UNSPECIFIED TYPE (HCC): ICD-10-CM

## 2020-04-17 PROCEDURE — 93291 INTERROG DEV EVAL SCRMS IP: CPT | Performed by: INTERNAL MEDICINE

## 2020-06-03 ENCOUNTER — OFFICE VISIT (OUTPATIENT)
Dept: CARDIOLOGY | Facility: CLINIC | Age: 70
End: 2020-06-03

## 2020-06-03 VITALS
BODY MASS INDEX: 27.28 KG/M2 | HEIGHT: 69 IN | SYSTOLIC BLOOD PRESSURE: 158 MMHG | DIASTOLIC BLOOD PRESSURE: 100 MMHG | HEART RATE: 100 BPM | WEIGHT: 184.2 LBS | OXYGEN SATURATION: 92 %

## 2020-06-03 DIAGNOSIS — I48.0 PAROXYSMAL ATRIAL FIBRILLATION (HCC): ICD-10-CM

## 2020-06-03 DIAGNOSIS — E78.2 MIXED HYPERLIPIDEMIA: ICD-10-CM

## 2020-06-03 DIAGNOSIS — G45.9 TIA (TRANSIENT ISCHEMIC ATTACK): ICD-10-CM

## 2020-06-03 DIAGNOSIS — I10 ESSENTIAL HYPERTENSION: ICD-10-CM

## 2020-06-03 DIAGNOSIS — R07.2 PRECORDIAL PAIN: ICD-10-CM

## 2020-06-03 DIAGNOSIS — Z86.73 HISTORY OF CVA (CEREBROVASCULAR ACCIDENT): ICD-10-CM

## 2020-06-03 DIAGNOSIS — Z95.818 OTHER SPECIFIED CARDIAC DEVICE IN SITU: ICD-10-CM

## 2020-06-03 DIAGNOSIS — I48.92 ATRIAL FLUTTER, UNSPECIFIED TYPE (HCC): Primary | ICD-10-CM

## 2020-06-03 PROCEDURE — 99214 OFFICE O/P EST MOD 30 MIN: CPT | Performed by: SPECIALIST

## 2020-06-03 RX ORDER — ASPIRIN 81 MG/1
81 TABLET ORAL DAILY
Qty: 90 TABLET | Refills: 3 | Status: SHIPPED | OUTPATIENT
Start: 2020-06-03

## 2020-06-03 RX ORDER — DIGOXIN 250 MCG
TABLET ORAL DAILY
COMMUNITY
Start: 2020-06-01

## 2020-06-03 RX ORDER — ONDANSETRON 8 MG/1
TABLET, ORALLY DISINTEGRATING ORAL
COMMUNITY
Start: 2020-05-06

## 2020-06-03 NOTE — PROGRESS NOTES
Subjective   Follow up, TIA  Mona Cordova is a 70 y.o. female who presents to day for Transient Ischemic Attack (Here for eval. ) and Atrial Flutter.    CHIEF COMPLIANT  Chief Complaint   Patient presents with   • Transient Ischemic Attack     Here for eval.    • Atrial Flutter       Problem List:      1. Tachycardia (Atrial flutter vs SVT)  a. Echocardiogram 8/29/18: EF 60-65%, trace MR, mild TR  b. Stress test 8/29/18: No evidence of ischemia, EF 61%  c. [employer] loop recorder implant, 10/2018, Dr. Mckeon  d. Atrial flutter vs SVT detected on loop recorder interrogation 08/2019  e. CHADSVASc = 5 (female, age, hx of CVA, HTN), on Eliquis  2. Hypertension  3. Hyperlipidemia  4. Hypothyroidism  5. Hx of  Multiple CVA- first in 2017 with history TPA  (Cryptogenic Stroke) - with deficits of memory impairment and left sided weakness.   6. GERD  7. Asthma  8. Negative sleep study        Problem List Items Addressed This Visit        Cardiovascular and Mediastinum    Essential hypertension    Atrial flutter (CMS/HCC) - Primary    Relevant Medications    digoxin (LANOXIN) 250 MCG tablet    Hyperlipidemia    Paroxysmal atrial fibrillation (CMS/HCC)    Relevant Medications    digoxin (LANOXIN) 250 MCG tablet    Other specified cardiac device in situ    Overview     Loop monitor         TIA (transient ischemic attack)       Other    History of CVA (cerebrovascular accident)          HPI  Had an episode 4 days ago, started with numbness on the back of the head, and headache, she was not responding for few minutes, after than she continued with confusion and disorientation for 4 days, and still having visual hallucination and seeing animals crowling on the walls, and had slurring of speech, she was not able to walk or get up, with weakness of both legs, no loss of sphinctor control, now headache has resolved and her thinking is more clear for last 2-3 days, she did not have palpitations during these episodes and now having  intermittent chest pressure with worsening shortness of breath                  PRIOR MEDS  Current Outpatient Medications on File Prior to Visit   Medication Sig Dispense Refill   • apixaban (Eliquis) 5 MG tablet tablet Take 1 tablet by mouth 2 (Two) Times a Day. 180 tablet 1   • CALCIUM PO Take 600 mg by mouth Daily.     • cetirizine (zyrTEC) 10 MG tablet Take 10 mg by mouth Daily.     • Cyanocobalamin (VITAMIN B-12 PO) Take 1 tablet by mouth Daily.     • digoxin (LANOXIN) 250 MCG tablet Daily.     • levothyroxine (SYNTHROID, LEVOTHROID) 125 MCG tablet Take 125 mcg by mouth Daily.     • losartan-hydrochlorothiazide (HYZAAR) 100-25 MG per tablet Take 1 tablet by mouth Daily. 90 tablet 1   • metoprolol tartrate (LOPRESSOR) 50 MG tablet Take 1 tablet by mouth 2 (Two) Times a Day. 180 tablet 1   • Multiple Vitamin (MULTI-VITAMIN PO) Take 1 tablet by mouth Daily. Centrum silver     • Multiple Vitamin (MULTI-VITAMIN PO) Take 1 tablet by mouth Daily. ICAPS VITAMIN     • ondansetron ODT (ZOFRAN-ODT) 8 MG disintegrating tablet prn     • pantoprazole (PROTONIX) 40 MG EC tablet Take 40 mg by mouth 2 (Two) Times a Day.     • QUEtiapine (SEROquel) 200 MG tablet Take 200 mg by mouth Every Night.     • raloxifene (EVISTA) 60 MG tablet Take 60 mg by mouth Daily.     • sucralfate (CARAFATE) 1 g tablet Take 1 g by mouth 2 (Two) Times a Day.     • vilazodone (VIIBRYD) 40 MG tablet tablet Take 40 mg by mouth Daily.     • XIFAXAN 550 MG tablet Take 550 mg by mouth As Needed.     • [DISCONTINUED] raNITIdine (ZANTAC) 150 MG tablet Take 150 mg by mouth Daily.       No current facility-administered medications on file prior to visit.        ALLERGIES  Morphine    HISTORY  Past Medical History:   Diagnosis Date   • Acid reflux    • Atrial fibrillation (CMS/HCC)    • Cancer (CMS/HCC)     BREAST   • Colitis    • Depression    • Depression    • Hypertension    • Stroke (CMS/HCC)    • Thyroid disorder    • TIA (transient ischemic attack)        "      Social History     Socioeconomic History   • Marital status:      Spouse name: Not on file   • Number of children: Not on file   • Years of education: Not on file   • Highest education level: Not on file   Tobacco Use   • Smoking status: Never Smoker   • Smokeless tobacco: Never Used   Substance and Sexual Activity   • Alcohol use: Yes     Frequency: Monthly or less     Comment: maria isabel   • Drug use: No   • Sexual activity: Defer       Family History   Problem Relation Age of Onset   • Emphysema Mother        Review of Systems   Constitutional: Positive for activity change and fatigue. Negative for appetite change.   HENT: Negative for congestion.    Eyes: Positive for visual disturbance (wears glasses). Negative for discharge and itching.   Respiratory: Positive for shortness of breath.    Cardiovascular: Positive for chest pain, palpitations and leg swelling.   Gastrointestinal: Positive for abdominal pain.   Endocrine: Negative.  Negative for cold intolerance and heat intolerance.   Genitourinary: Negative.  Negative for flank pain.   Musculoskeletal: Positive for arthralgias and myalgias.   Skin: Negative.  Negative for color change and pallor.   Allergic/Immunologic: Positive for environmental allergies.   Neurological: Positive for dizziness, light-headedness, numbness and headaches. Negative for syncope.        Freq, falls   Hematological: Bruises/bleeds easily.   Psychiatric/Behavioral: Positive for sleep disturbance. Negative for agitation and behavioral problems.       Objective     VITALS: /100 (BP Location: Left arm, Patient Position: Sitting)   Pulse 100   Ht 175.3 cm (69\")   Wt 83.6 kg (184 lb 3.2 oz)   SpO2 92%   BMI 27.20 kg/m²     LABS:   Lab Results (most recent)     None          IMAGING:   No Images in the past 120 days found..    EXAM:  Physical Exam   Constitutional: She is oriented to person, place, and time. She appears well-developed and well-nourished.   HENT:   Head: " Normocephalic and atraumatic.   Eyes: Pupils are equal, round, and reactive to light.   Neck: Neck supple. No JVD present. No thyromegaly present.   Cardiovascular: Normal rate, regular rhythm, normal heart sounds and intact distal pulses. Exam reveals no gallop and no friction rub.   No murmur heard.  Pulmonary/Chest: Effort normal and breath sounds normal. No stridor. No respiratory distress. She has no wheezes. She has no rales. She exhibits no tenderness.   Abdominal: She exhibits no distension. There is no tenderness.   Musculoskeletal: She exhibits no edema, tenderness or deformity.   Neurological: She is alert and oriented to person, place, and time. No cranial nerve deficit. Coordination abnormal.   Ataxic gait   Skin: Skin is warm and dry.   Psychiatric: She has a normal mood and affect.   Vitals reviewed.      Procedure   Procedures     EKG: NSR, otherwise unremarkable EKG, no previous EKG for comparison  Assessment/Plan    Diagnosis Plan   1. Atrial flutter, unspecified type (CMS/HCC)     2. Essential hypertension     3. Mixed hyperlipidemia     4. Other specified cardiac device in situ     5. Paroxysmal atrial fibrillation (CMS/HCC)     6. History of CVA (cerebrovascular accident)     7. TIA (transient ischemic attack)     1. Symptoms are c/w stroke, she is booked for brain MRI, and carotid Doppler, will interrogate the loop monitor and will get echocardiogram with bubble study, she has been compliant with eliquis, will add aspirin 81 mg daily, will also consult neurology  2. Now with intermittent chest pain, will proceed with stress testing to assess for ischemia  3. Will continue with eliquis  4. Will get lab results from Dr Serrano office  5. Blood pressure is elevated but she did not take her medications today, will monitor    No follow-ups on file.             MEDS ORDERED DURING VISIT:  No orders of the defined types were placed in this encounter.        This document has been electronically signed  by Francesco Khan MD  Racquel 3, 2020 16:17

## 2020-06-05 ENCOUNTER — OFFICE VISIT (OUTPATIENT)
Dept: CARDIOLOGY | Facility: CLINIC | Age: 70
End: 2020-06-05

## 2020-06-05 DIAGNOSIS — I63.9 RECURRENT CEREBROVASCULAR ACCIDENTS (CVAS) (HCC): Primary | ICD-10-CM

## 2020-06-05 DIAGNOSIS — R00.2 PALPITATIONS: ICD-10-CM

## 2020-06-05 PROCEDURE — 93291 INTERROG DEV EVAL SCRMS IP: CPT | Performed by: SPECIALIST

## 2020-06-17 ENCOUNTER — OFFICE VISIT (OUTPATIENT)
Dept: NEUROLOGY | Facility: CLINIC | Age: 70
End: 2020-06-17

## 2020-06-17 VITALS
HEIGHT: 69 IN | HEART RATE: 83 BPM | DIASTOLIC BLOOD PRESSURE: 78 MMHG | BODY MASS INDEX: 27.25 KG/M2 | SYSTOLIC BLOOD PRESSURE: 152 MMHG | WEIGHT: 184 LBS

## 2020-06-17 DIAGNOSIS — R41.0 EPISODIC CONFUSION: Primary | ICD-10-CM

## 2020-06-17 PROCEDURE — 99204 OFFICE O/P NEW MOD 45 MIN: CPT | Performed by: PSYCHIATRY & NEUROLOGY

## 2020-06-17 RX ORDER — LEVOTHYROXINE SODIUM 137 MCG
TABLET ORAL
COMMUNITY
Start: 2020-06-03 | End: 2020-07-01 | Stop reason: SDUPTHER

## 2020-06-17 NOTE — PROGRESS NOTES
Subjective:    CC: Mona Cordova is seen today in consultation at the request of Francesco Khan MD for episodes of confusion    HPI:  70-year-old female with a past medical history of paroxysmal atrial fibrillation, hypertension, hypothyroidism, stroke, hyperlipidemia, breast cancer status post chemotherapy (in remission since 1999), anxiety, depression presents with episodes of confusion.  Patient had a right caudate nucleus stroke in 2017 with  left-sided weakness.  She underwent a loop recorder placement after that and was found to have atrial flutter/A. fib and started on Eliquis along with aspirin 81 mg last year.  About 3 or 4 years ago she also started to have episodes which were thought to be TIAs.  During these she has a numbness in the back of her head followed by a headache.  After that she loses awareness and typically finds herself on the floor.  Also has slurred speech.  The confusion can last for days and she has visual hallucinations during the episode.  She has had about 6 such episodes with the last one being 3 weeks ago that was longest in duration and lasted for 4 days.  During that patient was at home and had the numbness as well as the headache.  After that she lost awareness.  Her  found her sitting on the floor, glassy eyed and she was trying to pull all the shoes off the shelf.  Had had urinary incontinence.  For the next 4 days she was confused with generalized weakness, slurring of speech and was seeing things including animals, people and bugs crawling on the walls.  She has no recollection of those days.   denies any automatisms or shaking.  Ever since this episode she has had weakness in both her legs.  She denies being sick during any of the episodes but has chronic insomnia despite taking Seroquel 200 mg at night.  She also states that in the past taking 3 to 4 ounces of vodka with vitamin water followed by sleeping has helped her calm down and get over the episode.  She  had a MRI brain 2 days ago that showed chronic ischemic changes as well as a remote right caudate lacunar infarct but no acute abnormalities.  Carotid Doppler, echo were also done the results of which are still pending.  Her blood work showed a white count of 11.1.  TSH was elevated at 13.6 and her Synthroid dose was adjusted.  Lipid panel was as follows-, , LDL 68, HDL 95.  Patient denies having febrile seizures as a child, family history of seizures or any concussions growing up.  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  There is a history of dementia in her paternal grandmother, aunt and uncle.    The following portions of the patient's history were reviewed today and updated as of 06/17/2020  : allergies, current medications, past family history, past medical history, past social history, past surgical history and problem list  These document will be scanned to patient's chart.      Current Outpatient Medications:   •  apixaban (Eliquis) 5 MG tablet tablet, Take 1 tablet by mouth 2 (Two) Times a Day., Disp: 180 tablet, Rfl: 1  •  aspirin 81 MG EC tablet, Take 1 tablet by mouth Daily., Disp: 90 tablet, Rfl: 3  •  CALCIUM PO, Take 600 mg by mouth Daily., Disp: , Rfl:   •  cetirizine (zyrTEC) 10 MG tablet, Take 10 mg by mouth Daily., Disp: , Rfl:   •  Cyanocobalamin (VITAMIN B-12 PO), Take 1 tablet by mouth Daily., Disp: , Rfl:   •  digoxin (LANOXIN) 250 MCG tablet, Daily., Disp: , Rfl:   •  levothyroxine (SYNTHROID, LEVOTHROID) 125 MCG tablet, Take 125 mcg by mouth Daily., Disp: , Rfl:   •  losartan-hydrochlorothiazide (HYZAAR) 100-25 MG per tablet, Take 1 tablet by mouth Daily., Disp: 90 tablet, Rfl: 1  •  metoprolol tartrate (LOPRESSOR) 50 MG tablet, Take 1 tablet by mouth 2 (Two) Times a Day., Disp: 180 tablet, Rfl: 1  •  Multiple Vitamin (MULTI-VITAMIN PO), Take 1 tablet by mouth Daily. Centrum silver, Disp: , Rfl:   •  Multiple Vitamin (MULTI-VITAMIN PO), Take 1 tablet by  mouth Daily. ICAPS VITAMIN, Disp: , Rfl:   •  ondansetron ODT (ZOFRAN-ODT) 8 MG disintegrating tablet, prn, Disp: , Rfl:   •  pantoprazole (PROTONIX) 40 MG EC tablet, Take 40 mg by mouth 2 (Two) Times a Day., Disp: , Rfl:   •  QUEtiapine (SEROquel) 200 MG tablet, Take 200 mg by mouth Every Night., Disp: , Rfl:   •  raloxifene (EVISTA) 60 MG tablet, Take 60 mg by mouth Daily., Disp: , Rfl:   •  sucralfate (CARAFATE) 1 g tablet, Take 1 g by mouth 2 (Two) Times a Day., Disp: , Rfl:   •  SYNTHROID 137 MCG tablet, , Disp: , Rfl:   •  vilazodone (VIIBRYD) 40 MG tablet tablet, Take 40 mg by mouth Daily., Disp: , Rfl:   •  XIFAXAN 550 MG tablet, Take 550 mg by mouth As Needed., Disp: , Rfl:    Past Medical History:   Diagnosis Date   • Acid reflux    • Atrial fibrillation (CMS/HCC)    • Cancer (CMS/HCC)     BREAST   • Colitis    • Depression    • Depression    • Hypertension    • Stroke (CMS/HCC)    • Thyroid disorder    • TIA (transient ischemic attack)       Past Surgical History:   Procedure Laterality Date   • BREAST SURGERY      BREAST CANCER   • CERVICAL BIOPSY  W/ LOOP ELECTRODE EXCISION     • GASTRIC SLEEVE LAPAROSCOPIC     • HYSTERECTOMY     • REPLACEMENT TOTAL KNEE      BOTH   • VAGOTOMY        Family History   Problem Relation Age of Onset   • Emphysema Mother       Social History     Socioeconomic History   • Marital status:      Spouse name: Not on file   • Number of children: Not on file   • Years of education: Not on file   • Highest education level: Not on file   Tobacco Use   • Smoking status: Never Smoker   • Smokeless tobacco: Never Used   Substance and Sexual Activity   • Alcohol use: Yes     Frequency: Monthly or less     Comment: maria isabel   • Drug use: No   • Sexual activity: Defer     Review of Systems   Neurological: Positive for speech difficulty, weakness and confusion.   Psychiatric/Behavioral: Positive for hallucinations.   All other systems reviewed and are negative.      Objective:    BP  "152/78   Pulse 83   Ht 175.3 cm (69\")   Wt 83.5 kg (184 lb)   BMI 27.17 kg/m²     Neurology Exam:    General apperance: NAD.     Mental status: Alert, awake and oriented to time place and person.    Recent and Remote memory: Intact.  Delayed recall of 3/3 today    Attention span and Concentration: Normal.     Language and Speech: Intact- No dysarthria.    Fluency, Naming , Repitition and Comprehension:  Intact    Cranial Nerves:   CN II: Visual fields are full. Intact. Fundi - Normal, No papillederma, Pupils - RAHUL  CN III, IV and VI: Extraocular movements are intact. Normal saccades.   CN V: Facial sensation is intact.   CN VII: Muscles of facial expression reveal no asymmetry. Intact.   CN VIII: Hearing is intact. Whispered voice intact.   CN IX and X: Palate elevates symmetrically. Intact  CN XI: Shoulder shrug is intact.   CN XII: Tongue is midline without evidence of atrophy or fasciculation.     Ophthalmoscopic exam of optic disc-normal    Motor:  Right UE muscle strength 5/5. Normal tone.     Left UE muscle strength 5/5. Normal tone.      Right LE muscle strength5-/5. Normal tone.     Left LE muscle strength 5-/5. Normal tone.      Sensory: Normal light touch, vibration and pinprick sensation bilaterally.    DTRs: 2+ bilaterally in upper and lower extremities.    Babinski: Negative bilaterally.    Co-ordination: Normal finger-to-nose, heel to shin B/L.    Rhomberg: Negative.    Gait: Normal.    Cardiovascular: Regular rate and rhythm without murmur, gallop or rub.    Assessment and Plan:  1. Episodic confusion  Based on the semiology it is possible that the patient has complex focal seizures with a prolonged postictal phase versus stress-induced spells versus TIAs (less likely)  She is already on aspirin 81 mg along with Eliquis 5 mg twice a day.  Her echocardiogram and carotid Doppler are still pending.  MRI brain did not show any acute abnormalities  I will get an EEG.  If that is normal may get " long-term video EEG monitoring  I have counseled her on seizure precautions including no driving for 3 months from her last episode    - EEG Continuous Monitoring With Video; Future       Return in about 2 months (around 8/17/2020).     I spent over 60 minutes with the patient face to face out of which over 50% (30 minutes) was spent in management, instructions and education.     Benita Richter MD

## 2020-06-25 ENCOUNTER — HOSPITAL ENCOUNTER (OUTPATIENT)
Dept: CARDIOLOGY | Facility: HOSPITAL | Age: 70
Discharge: HOME | End: 2020-06-25

## 2020-06-25 DIAGNOSIS — I48.92 ATRIAL FLUTTER, UNSPECIFIED TYPE (HCC): ICD-10-CM

## 2020-06-25 DIAGNOSIS — R07.2 PRECORDIAL PAIN: ICD-10-CM

## 2020-06-25 LAB
BH CV ECHO MEAS - ACS: 1.3 CM
BH CV ECHO MEAS - AO MAX PG (FULL): 2.4 MMHG
BH CV ECHO MEAS - AO MAX PG: 7.4 MMHG
BH CV ECHO MEAS - AO MEAN PG (FULL): 1 MMHG
BH CV ECHO MEAS - AO MEAN PG: 4 MMHG
BH CV ECHO MEAS - AO ROOT AREA (BSA CORRECTED): 1.3
BH CV ECHO MEAS - AO ROOT AREA: 5.3 CM^2
BH CV ECHO MEAS - AO ROOT DIAM: 2.6 CM
BH CV ECHO MEAS - AO V2 MAX: 136 CM/SEC
BH CV ECHO MEAS - AO V2 MEAN: 96.2 CM/SEC
BH CV ECHO MEAS - AO V2 VTI: 30.3 CM
BH CV ECHO MEAS - AVA(I,A): 2.5 CM^2
BH CV ECHO MEAS - AVA(I,D): 2.5 CM^2
BH CV ECHO MEAS - AVA(V,A): 2.6 CM^2
BH CV ECHO MEAS - AVA(V,D): 2.6 CM^2
BH CV ECHO MEAS - BSA(HAYCOCK): 2 M^2
BH CV ECHO MEAS - BSA: 2 M^2
BH CV ECHO MEAS - BZI_BMI: 27.2 KILOGRAMS/M^2
BH CV ECHO MEAS - BZI_METRIC_HEIGHT: 175.3 CM
BH CV ECHO MEAS - BZI_METRIC_WEIGHT: 83.5 KG
BH CV ECHO MEAS - EDV(CUBED): 34.6 ML
BH CV ECHO MEAS - EDV(MOD-SP4): 60.9 ML
BH CV ECHO MEAS - EDV(TEICH): 42.8 ML
BH CV ECHO MEAS - EF(CUBED): 53.3 %
BH CV ECHO MEAS - EF(MOD-SP4): 42 %
BH CV ECHO MEAS - EF(TEICH): 46.3 %
BH CV ECHO MEAS - ESV(CUBED): 16.2 ML
BH CV ECHO MEAS - ESV(MOD-SP4): 35.3 ML
BH CV ECHO MEAS - ESV(TEICH): 23 ML
BH CV ECHO MEAS - FS: 22.4 %
BH CV ECHO MEAS - IVS/LVPW: 0.96
BH CV ECHO MEAS - IVSD: 0.99 CM
BH CV ECHO MEAS - LA DIMENSION: 3.9 CM
BH CV ECHO MEAS - LA/AO: 1.5
BH CV ECHO MEAS - LV DIASTOLIC VOL/BSA (35-75): 30.5 ML/M^2
BH CV ECHO MEAS - LV IVRT: 0.09 SEC
BH CV ECHO MEAS - LV MASS(C)D: 93.9 GRAMS
BH CV ECHO MEAS - LV MASS(C)DI: 47.1 GRAMS/M^2
BH CV ECHO MEAS - LV MAX PG: 5 MMHG
BH CV ECHO MEAS - LV MEAN PG: 3 MMHG
BH CV ECHO MEAS - LV SYSTOLIC VOL/BSA (12-30): 17.7 ML/M^2
BH CV ECHO MEAS - LV V1 MAX: 112 CM/SEC
BH CV ECHO MEAS - LV V1 MEAN: 82 CM/SEC
BH CV ECHO MEAS - LV V1 VTI: 24.4 CM
BH CV ECHO MEAS - LVIDD: 3.3 CM
BH CV ECHO MEAS - LVIDS: 2.5 CM
BH CV ECHO MEAS - LVLD AP4: 6.7 CM
BH CV ECHO MEAS - LVLS AP4: 6.8 CM
BH CV ECHO MEAS - LVOT AREA (M): 3.1 CM^2
BH CV ECHO MEAS - LVOT AREA: 3.1 CM^2
BH CV ECHO MEAS - LVOT DIAM: 2 CM
BH CV ECHO MEAS - LVPWD: 1 CM
BH CV ECHO MEAS - MV A MAX VEL: 54.1 CM/SEC
BH CV ECHO MEAS - MV DEC SLOPE: 530 CM/SEC^2
BH CV ECHO MEAS - MV E MAX VEL: 103 CM/SEC
BH CV ECHO MEAS - MV E/A: 1.9
BH CV ECHO MEAS - RAP SYSTOLE: 10 MMHG
BH CV ECHO MEAS - RVDD: 3.2 CM
BH CV ECHO MEAS - RVSP: 37 MMHG
BH CV ECHO MEAS - SI(AO): 80.7 ML/M^2
BH CV ECHO MEAS - SI(CUBED): 9.3 ML/M^2
BH CV ECHO MEAS - SI(LVOT): 38.4 ML/M^2
BH CV ECHO MEAS - SI(MOD-SP4): 12.8 ML/M^2
BH CV ECHO MEAS - SI(TEICH): 10 ML/M^2
BH CV ECHO MEAS - SV(AO): 160.9 ML
BH CV ECHO MEAS - SV(CUBED): 18.5 ML
BH CV ECHO MEAS - SV(LVOT): 76.7 ML
BH CV ECHO MEAS - SV(MOD-SP4): 25.6 ML
BH CV ECHO MEAS - SV(TEICH): 19.9 ML
BH CV ECHO MEAS - TR MAX VEL: 258 CM/SEC
MAXIMAL PREDICTED HEART RATE: 148 BPM
STRESS TARGET HR: 126 BPM

## 2020-06-25 PROCEDURE — 0 TECHNETIUM SESTAMIBI: Performed by: SPECIALIST

## 2020-06-25 PROCEDURE — A9500 TC99M SESTAMIBI: HCPCS | Performed by: SPECIALIST

## 2020-06-25 PROCEDURE — 93017 CV STRESS TEST TRACING ONLY: CPT

## 2020-06-25 PROCEDURE — 93306 TTE W/DOPPLER COMPLETE: CPT | Performed by: SPECIALIST

## 2020-06-25 PROCEDURE — 93018 CV STRESS TEST I&R ONLY: CPT | Performed by: SPECIALIST

## 2020-06-25 PROCEDURE — 78452 HT MUSCLE IMAGE SPECT MULT: CPT | Performed by: SPECIALIST

## 2020-06-25 PROCEDURE — 93306 TTE W/DOPPLER COMPLETE: CPT

## 2020-06-25 PROCEDURE — 25010000002 REGADENOSON 0.4 MG/5ML SOLUTION: Performed by: SPECIALIST

## 2020-06-25 PROCEDURE — 78452 HT MUSCLE IMAGE SPECT MULT: CPT

## 2020-06-25 RX ORDER — SODIUM CHLORIDE 9 MG/ML
8 INJECTION INTRAMUSCULAR; INTRAVENOUS; SUBCUTANEOUS ONCE AS NEEDED
Status: DISCONTINUED | OUTPATIENT
Start: 2020-06-25 | End: 2020-06-26 | Stop reason: HOSPADM

## 2020-06-25 RX ADMIN — TECHNETIUM TC 99M SESTAMIBI 1 DOSE: 1 INJECTION INTRAVENOUS at 11:00

## 2020-06-25 RX ADMIN — TECHNETIUM TC 99M SESTAMIBI 1 DOSE: 1 INJECTION INTRAVENOUS at 08:31

## 2020-06-25 RX ADMIN — REGADENOSON 0.4 MG: 0.08 INJECTION, SOLUTION INTRAVENOUS at 11:00

## 2020-06-26 LAB
BH CV NUCLEAR PRIOR STUDY: 3
BH CV STRESS BP STAGE 1: NORMAL
BH CV STRESS COMMENTS STAGE 1: NORMAL
BH CV STRESS DOSE REGADENOSON STAGE 1: 0.4
BH CV STRESS DURATION MIN STAGE 1: 0
BH CV STRESS DURATION SEC STAGE 1: 10
BH CV STRESS HR STAGE 1: 92
BH CV STRESS PROTOCOL 1: NORMAL
BH CV STRESS RECOVERY BP: NORMAL MMHG
BH CV STRESS RECOVERY HR: 85 BPM
BH CV STRESS STAGE 1: 1
LV EF NUC BP: 79 %
MAXIMAL PREDICTED HEART RATE: 148 BPM
PERCENT MAX PREDICTED HR: 63.51 %
STRESS BASELINE BP: NORMAL MMHG
STRESS BASELINE HR: 78 BPM
STRESS PERCENT HR: 75 %
STRESS POST PEAK BP: NORMAL MMHG
STRESS POST PEAK HR: 94 BPM
STRESS TARGET HR: 126 BPM

## 2020-07-01 ENCOUNTER — OFFICE VISIT (OUTPATIENT)
Dept: CARDIOLOGY | Facility: CLINIC | Age: 70
End: 2020-07-01

## 2020-07-01 VITALS
HEIGHT: 69 IN | OXYGEN SATURATION: 99 % | SYSTOLIC BLOOD PRESSURE: 125 MMHG | HEART RATE: 75 BPM | BODY MASS INDEX: 27.88 KG/M2 | WEIGHT: 188.2 LBS | DIASTOLIC BLOOD PRESSURE: 85 MMHG

## 2020-07-01 DIAGNOSIS — R07.2 PRECORDIAL PAIN: ICD-10-CM

## 2020-07-01 DIAGNOSIS — Z86.73 HISTORY OF CVA (CEREBROVASCULAR ACCIDENT): ICD-10-CM

## 2020-07-01 DIAGNOSIS — I48.92 ATRIAL FLUTTER, UNSPECIFIED TYPE (HCC): ICD-10-CM

## 2020-07-01 DIAGNOSIS — E78.2 MIXED HYPERLIPIDEMIA: ICD-10-CM

## 2020-07-01 DIAGNOSIS — G45.9 TIA (TRANSIENT ISCHEMIC ATTACK): ICD-10-CM

## 2020-07-01 DIAGNOSIS — I10 ESSENTIAL HYPERTENSION: Primary | ICD-10-CM

## 2020-07-01 DIAGNOSIS — I48.0 PAROXYSMAL ATRIAL FIBRILLATION (HCC): ICD-10-CM

## 2020-07-01 PROCEDURE — 99213 OFFICE O/P EST LOW 20 MIN: CPT | Performed by: SPECIALIST

## 2020-07-01 NOTE — PATIENT INSTRUCTIONS
Obesity, Adult  Obesity is the condition of having too much total body fat. Being overweight or obese means that your weight is greater than what is considered healthy for your body size. Obesity is determined by a measurement called BMI. BMI is an estimate of body fat and is calculated from height and weight. For adults, a BMI of 30 or higher is considered obese.  Obesity can lead to other health concerns and major illnesses, including:  · Stroke.  · Coronary artery disease (CAD).  · Type 2 diabetes.  · Some types of cancer, including cancers of the colon, breast, uterus, and gallbladder.  · Osteoarthritis.  · High blood pressure (hypertension).  · High cholesterol.  · Sleep apnea.  · Gallbladder stones.  · Infertility problems.  What are the causes?  Common causes of this condition include:  · Eating daily meals that are high in calories, sugar, and fat.  · Being born with genes that may make you more likely to become obese.  · Having a medical condition that causes obesity, including:  ? Hypothyroidism.  ? Polycystic ovarian syndrome (PCOS).  ? Binge-eating disorder.  ? Cushing syndrome.  · Taking certain medicines, such as steroids, antidepressants, and seizure medicines.  · Not being physically active (sedentary lifestyle).  · Not getting enough sleep.  · Drinking high amounts of sugar-sweetened beverages, such as soft drinks.  What increases the risk?  The following factors may make you more likely to develop this condition:  · Having a family history of obesity.  · Being a woman of  descent.  · Being a man of  descent.  · Living in an area with limited access to:  ? [place], recreation centers, or sidewalks.  ? Healthy food choices, such as grocery stores and farmers' markets.  What are the signs or symptoms?  The main sign of this condition is having too much body fat.  How is this diagnosed?  This condition is diagnosed based on:  · Your BMI. If you are an adult with a BMI of 30 or  higher, you are considered obese.  · Your waist circumference. This measures the distance around your waistline.  · Your skinfold thickness. Your health care provider may gently pinch a fold of your skin and measure it.  You may have other tests to check for underlying conditions.  How is this treated?  Treatment for this condition often includes changing your lifestyle. Treatment may include some or all of the following:  · Dietary changes. This may include developing a healthy meal plan.  · Regular physical activity. This may include activity that causes your heart to beat faster (aerobic exercise) and strength training. Work with your health care provider to design an exercise program that works for you.  · Medicine to help you lose weight if you are unable to lose 1 pound a week after 6 weeks of healthy eating and more physical activity.  · Treating conditions that cause the obesity (underlying conditions).  · Surgery. Surgical options may include gastric banding and gastric bypass. Surgery may be done if:  ? Other treatments have not helped to improve your condition.  ? You have a BMI of 40 or higher.  ? You have life-threatening health problems related to obesity.  Follow these instructions at home:  Eating and drinking    · Follow recommendations from your health care provider about what you eat and drink. Your health care provider may advise you to:  ? Limit fast food, sweets, and processed snack foods.  ? Choose low-fat options, such as low-fat milk instead of whole milk.  ? Eat 5 or more servings of fruits or vegetables every day.  ? Eat at home more often. This gives you more control over what you eat.  ? Choose healthy foods when you eat out.  ? Learn to read food labels. This will help you understand how much food is considered 1 serving.  ? Learn what a healthy serving size is.  ? Keep low-fat snacks available.  ? Limit sugary drinks, such as soda, fruit juice, sweetened iced tea, and flavored  milk.  · Drink enough water to keep your urine pale yellow.  · Do not follow a fad diet. Fad diets can be unhealthy and even dangerous.  Physical activity  · Exercise regularly, as told by your health care provider.  ? Most adults should get up to 150 minutes of moderate-intensity exercise every week.  ? Ask your health care provider what types of exercise are safe for you and how often you should exercise.  · Warm up and stretch before being active.  · Cool down and stretch after being active.  · Rest between periods of activity.  Lifestyle  · Work with your health care provider and a dietitian to set a weight-loss goal that is healthy and reasonable for you.  · Limit your screen time.  · Find ways to reward yourself that do not involve food.  · Do not drink alcohol if:  ? Your health care provider tells you not to drink.  ? You are pregnant, may be pregnant, or are planning to become pregnant.  · If you drink alcohol:  ? Limit how much you use to:  § 0-1 drink a day for women.  § 0-2 drinks a day for men.  ? Be aware of how much alcohol is in your drink. In the [place] one drink equals one 12 oz bottle of beer (355 mL), one 5 oz glass of wine (148 mL), or one 1½ oz glass of hard liquor (44 mL).  General instructions  · Keep a weight-loss journal to keep track of the food you eat and how much exercise you get.  · Take over-the-counter and prescription medicines only as told by your health care provider.  · Take vitamins and supplements only as told by your health care provider.  · Consider joining a support group. Your health care provider may be able to recommend a support group.  · Keep all follow-up visits as told by your health care provider. This is important.  Contact a health care provider if:  · You are unable to meet your weight loss goal after 6 weeks of dietary and lifestyle changes.  Get help right away if you are having:  · Trouble breathing.  · Suicidal thoughts or behaviors.  Summary  · Obesity is the  condition of having too much total body fat.  · Being overweight or obese means that your weight is greater than what is considered healthy for your body size.  · Work with your health care provider and a dietitian to set a weight-loss goal that is healthy and reasonable for you.  · Exercise regularly, as told by your health care provider. Ask your health care provider what types of exercise are safe for you and how often you should exercise.  This information is not intended to replace advice given to you by your health care provider. Make sure you discuss any questions you have with your health care provider.  Document Released: 01/25/2006 Document Revised: 08/22/2019 Document Reviewed: 08/22/2019  [employer] Patient Education © 2020 [employer]  MyPlate from [employer]    MyPlate is an outline of a general healthy diet based on the 2010 Dietary Guidelines for Americans, from the [place] Department of Agriculture (USDA). It sets guidelines for how much food you should eat from each food group based on your age, sex, and level of physical activity.  What are tips for following MyPlate?  To follow MyPlate recommendations:  · Eat a wide variety of fruits and vegetables, grains, and protein foods.  · Serve smaller portions and eat less food throughout the day.  · Limit portion sizes to avoid overeating.  · Enjoy your food.  · Get at least 150 minutes of exercise every week. This is about 30 minutes each day, 5 or more days per week.  It can be difficult to have every meal look like MyPlate. Think about MyPlate as eating guidelines for an entire day, rather than each individual meal.  Fruits and vegetables  · Make half of your plate fruits and vegetables.  · Eat many different colors of fruits and vegetables each day.  · For a 2,000 calorie daily food plan, eat:  ? 2½ cups of vegetables every day.  ? 2 cups of fruit every day.  · 1 cup is equal to:  ? 1 cup raw or cooked vegetables.  ? 1 cup raw fruit.  ? 1 medium-sized orange,  apple, or banana.  ? 1 cup 100% fruit or vegetable juice.  ? 2 cups raw leafy greens, such as lettuce, spinach, or kale.  ? ½ cup dried fruit.  Grains  · One fourth of your plate should be grains.  · Make at least half of the grains you eat each day whole grains.  · For a 2,000 calorie daily food plan, eat 6 oz of grains every day.  · 1 oz is equal to:  ? 1 slice bread.  ? 1 cup cereal.  ? ½ cup cooked rice, cereal, or pasta.  Protein  · One fourth of your plate should be protein.  · Eat a wide variety of protein foods, including meat, poultry, fish, eggs, beans, nuts, and tofu.  · For a 2,000 calorie daily food plan, eat 5½ oz of protein every day.  · 1 oz is equal to:  ? 1 oz meat, poultry, or fish.  ? ¼ cup cooked beans.  ? 1 egg.  ? ½ oz nuts or seeds.  ? 1 Tbsp peanut butter.  Dairy  · Drink fat-free or low-fat (1%) milk.  · Eat or drink dairy as a side to meals.  · For a 2,000 calorie daily food plan, eat or drink 3 cups of dairy every day.  · 1 cup is equal to:  ? 1 cup milk, yogurt, cottage cheese, or soy milk (soy beverage).  ? 2 oz processed cheese.  ? 1½ oz natural cheese.  Fats, oils, salt, and sugars  · Only small amounts of oils are recommended.  · Avoid foods that are high in calories and low in nutritional value (empty calories), like foods high in fat or added sugars.  · Choose foods that are low in salt (sodium). Choose foods that have less than 140 milligrams (mg) of sodium per serving.  · Drink water instead of sugary drinks. Drink enough water each day to keep your urine pale yellow.  Where to find support  · Work with your health care provider or a nutrition specialist (dietitian) to develop a customized eating plan that is right for you.  · Download an jcarlos (mobile application) to help you track your daily food intake.  Where to find more information  · Go to ChooseMyPlate.gov for more information.  · Learn more and log your daily food intake according to MyPlate using USDA's SuperTracker:  www.supertracker.usda.gov  Summary  · MyPlate is a general guideline for healthy eating from the USDA. It is based on the 2010 Dietary Guidelines for Americans.  · In general, fruits and vegetables should take up ½ of your plate, grains should take up ¼ of your plate, and protein should take up ¼ of your plate.  This information is not intended to replace advice given to you by your health care provider. Make sure you discuss any questions you have with your health care provider.  Document Released: 01/06/2009 Document Revised: 01/19/2019 Document Reviewed: 03/19/2018  [employer] Patient Education © 2020 [employer]

## 2020-07-01 NOTE — PROGRESS NOTES
Subjective   Follow up, atrial fibrilation  Mona Cordova is a 70 y.o. female who presents to day for Atrial Fibrillation (Here for a one month follow up ); Hypertension; and Atrial Flutter.    CHIEF COMPLIANT  Chief Complaint   Patient presents with   • Atrial Fibrillation     Here for a one month follow up    • Hypertension   • Atrial Flutter       1. Problem list:  Tachycardia (Atrial flutter vs SVT)  a. Echocardiogram 8/29/18: EF 60-65%, trace MR, mild TR  b. Stress test 8/29/18: No evidence of ischemia, EF 61%  c. [facility] loop recorder implant, 10/2018, Dr. Mckeon  d. Atrial flutter vs SVT detected on loop recorder interrogation 08/2019  e. CHADSVASc = 5 (female, age, hx of CVA, HTN), on Eliquis  f. Echo, 6-, EF 56-60%, grade 2 DD, mild TR, pulm. Pressures 37 mmHg, neg. Bubble for rt. To lt shunting  g. Stress test, 6-, no ischemia  2. Hypertension  3. Hyperlipidemia  4. Hypothyroidism  5. Hx of  Multiple CVA- first in 2017 with history TPA  (Cryptogenic Stroke) - with deficits of memory impairment and left sided   weakness.   5.1 Carotid u/s, 6-, < 50% stenosis bilat.   5.2 MRI brain, 6-, no acute infarct, rempote lacunar infarct in rt. Basal ganglia  6. GERD  7. Asthma  8. Negative sleep study        Active Problems:  Problem List Items Addressed This Visit        Cardiovascular and Mediastinum    Essential hypertension - Primary    Atrial flutter (CMS/HCC)    Hyperlipidemia    Paroxysmal atrial fibrillation (CMS/HCC)    TIA (transient ischemic attack)       Nervous and Auditory    Precordial pain       Other    History of CVA (cerebrovascular accident)          HPI      Seen by neurologist who is doing EEG for concern of seizure disorder, rare episodes of chest pain and palpitations, shortness of breath is better, with mild LE edema, dizziness on standing from sitting position occasionally                        PRIOR MEDS  Current Outpatient Medications on File Prior to Visit     Medication Sig Dispense Refill   • apixaban (Eliquis) 5 MG tablet tablet Take 1 tablet by mouth 2 (Two) Times a Day. 180 tablet 1   • aspirin 81 MG EC tablet Take 1 tablet by mouth Daily. 90 tablet 3   • CALCIUM PO Take 600 mg by mouth Daily.     • cetirizine (zyrTEC) 10 MG tablet Take 10 mg by mouth Daily.     • Cyanocobalamin (VITAMIN B-12 PO) Take 1 tablet by mouth Daily.     • digoxin (LANOXIN) 250 MCG tablet Daily.     • levothyroxine sodium (TIROSINT) 137 MCG capsule Take 137 mcg by mouth Daily.     • losartan-hydrochlorothiazide (HYZAAR) 100-25 MG per tablet Take 1 tablet by mouth Daily. 90 tablet 1   • metoprolol tartrate (LOPRESSOR) 50 MG tablet Take 1 tablet by mouth 2 (Two) Times a Day. 180 tablet 1   • Multiple Vitamin (MULTI-VITAMIN PO) Take 1 tablet by mouth Daily. Centrum silver     • Multiple Vitamin (MULTI-VITAMIN PO) Take 1 tablet by mouth Daily. ICAPS VITAMIN     • ondansetron ODT (ZOFRAN-ODT) 8 MG disintegrating tablet prn     • pantoprazole (PROTONIX) 40 MG EC tablet Take 40 mg by mouth 2 (Two) Times a Day.     • QUEtiapine (SEROquel) 200 MG tablet Take 200 mg by mouth Every Night.     • raloxifene (EVISTA) 60 MG tablet Take 60 mg by mouth Daily.     • sucralfate (CARAFATE) 1 g tablet Take 1 g by mouth 2 (Two) Times a Day.     • vilazodone (VIIBRYD) 40 MG tablet tablet Take 40 mg by mouth Daily.     • XIFAXAN 550 MG tablet Take 550 mg by mouth As Needed.     • [DISCONTINUED] SYNTHROID 137 MCG tablet        No current facility-administered medications on file prior to visit.        ALLERGIES  Morphine    HISTORY  Past Medical History:   Diagnosis Date   • Acid reflux    • Atrial fibrillation (CMS/HCC)    • Cancer (CMS/HCC)     BREAST   • Colitis    • Depression    • Depression    • Hypertension    • Stroke (CMS/HCC)    • Thyroid disorder    • TIA (transient ischemic attack)        Social History     Socioeconomic History   • Marital status:      Spouse name: Not on file   • Number of  "children: Not on file   • Years of education: Not on file   • Highest education level: Not on file   Tobacco Use   • Smoking status: Never Smoker   • Smokeless tobacco: Never Used   Substance and Sexual Activity   • Alcohol use: Yes     Frequency: Monthly or less     Comment: maria isabel   • Drug use: No   • Sexual activity: Defer       Family History   Problem Relation Age of Onset   • Emphysema Mother        Review of Systems   Constitutional: Positive for fatigue. Negative for activity change and appetite change.   HENT: Negative.  Negative for congestion.    Eyes: Positive for visual disturbance (wears glasses). Negative for discharge.   Respiratory: Positive for shortness of breath (with minimal activity).    Cardiovascular: Positive for chest pain, palpitations and leg swelling.   Gastrointestinal: Negative for blood in stool (no melena,hemturia,hemoptysis,hemtochezia).   Endocrine: Negative.  Negative for cold intolerance and heat intolerance.   Genitourinary: Negative.  Negative for flank pain.   Musculoskeletal: Positive for arthralgias and myalgias.   Skin: Negative.  Negative for color change and pallor.   Allergic/Immunologic: Positive for environmental allergies.   Neurological: Positive for light-headedness (with getting up to quick).   Hematological: Negative.  Does not bruise/bleed easily.   Psychiatric/Behavioral: Positive for sleep disturbance.       Objective     VITALS: /85 (BP Location: Right arm, Patient Position: Sitting)   Pulse 75   Ht 175.3 cm (69\")   Wt 85.4 kg (188 lb 3.2 oz)   SpO2 99%   BMI 27.79 kg/m²     LABS:   Lab Results (most recent)     None          IMAGING:   No Images in the past 120 days found..    EXAM:  Physical Exam   Constitutional: She is oriented to person, place, and time. She appears well-developed and well-nourished.   HENT:   Head: Normocephalic and atraumatic.   Eyes: Pupils are equal, round, and reactive to light.   Neck: Neck supple. No JVD present. No " thyromegaly present.   Cardiovascular: Normal rate, regular rhythm, normal heart sounds and intact distal pulses. Exam reveals no gallop and no friction rub.   No murmur heard.  Pulmonary/Chest: Effort normal and breath sounds normal. No stridor. No respiratory distress. She has no wheezes. She has no rales. She exhibits no tenderness.   Musculoskeletal: She exhibits no edema, tenderness or deformity.   Neurological: She is alert and oriented to person, place, and time. No cranial nerve deficit. Coordination normal.   Skin: Skin is warm and dry.   Psychiatric: She has a normal mood and affect.   Vitals reviewed.      Procedure   Procedures       Assessment/Plan    Diagnosis Plan   1. Essential hypertension     2. Atrial flutter, unspecified type (CMS/HCC)     3. Mixed hyperlipidemia     4. Paroxysmal atrial fibrillation (CMS/HCC)     5. TIA (transient ischemic attack)     6. Precordial pain     7. History of CVA (cerebrovascular accident)     1. Blood pressure is well controlled, will continue current management  2. In sinus rhythm, continue current management  3. I dicussed MRI, carotid Doppler and echocardiogram in details  4. Will get EEG to assess for seizure  5. I discussed lipid profile    No follow-ups on file.    Mona was seen today for atrial fibrillation, hypertension and atrial flutter.    Diagnoses and all orders for this visit:    Essential hypertension    Atrial flutter, unspecified type (CMS/HCC)    Mixed hyperlipidemia    Paroxysmal atrial fibrillation (CMS/HCC)    TIA (transient ischemic attack)    Precordial pain    History of CVA (cerebrovascular accident)        Mona Cordova  reports that she has never smoked. She has never used smokeless tobacco..                   MEDS ORDERED DURING VISIT:  No orders of the defined types were placed in this encounter.        This document has been electronically signed by Francesco Khan MD  July 1, 2020 15:19

## 2020-07-17 ENCOUNTER — HOSPITAL ENCOUNTER (OUTPATIENT)
Dept: NEUROLOGY | Facility: HOSPITAL | Age: 70
Discharge: HOME | End: 2020-07-17
Admitting: PSYCHIATRY & NEUROLOGY

## 2020-07-17 DIAGNOSIS — R41.0 EPISODIC CONFUSION: ICD-10-CM

## 2020-07-17 PROCEDURE — 95713 VEEG 2-12 HR CONT MNTR: CPT

## 2020-07-17 PROCEDURE — 95813 EEG EXTND MNTR 61-119 MIN: CPT | Performed by: PSYCHIATRY & NEUROLOGY

## 2020-07-20 ENCOUNTER — TELEPHONE (OUTPATIENT)
Dept: NEUROLOGY | Facility: CLINIC | Age: 70
End: 2020-07-20

## 2020-07-20 NOTE — TELEPHONE ENCOUNTER
----- Message from Benita Richter MD sent at 7/20/2020  1:31 PM EDT -----  Could you please tell the patient that her EEG did not show any seizure-like activity however if she has continued to have the spells of confusion then I can start her on a low-dose of seizure medication.  Thanks

## 2020-07-21 NOTE — TELEPHONE ENCOUNTER
Relayed this to Mona who stated understanding and will go back and check her calender for the last episode then and put a campbell 90 days from then. Thanks!

## 2020-08-17 ENCOUNTER — OFFICE VISIT (OUTPATIENT)
Dept: NEUROLOGY | Facility: CLINIC | Age: 70
End: 2020-08-17

## 2020-08-17 VITALS
BODY MASS INDEX: 27.16 KG/M2 | OXYGEN SATURATION: 95 % | DIASTOLIC BLOOD PRESSURE: 82 MMHG | TEMPERATURE: 97.3 F | HEIGHT: 69 IN | WEIGHT: 183.4 LBS | SYSTOLIC BLOOD PRESSURE: 126 MMHG | HEART RATE: 52 BPM

## 2020-08-17 DIAGNOSIS — R41.0 EPISODIC CONFUSION: Primary | ICD-10-CM

## 2020-08-17 PROCEDURE — 99214 OFFICE O/P EST MOD 30 MIN: CPT | Performed by: PSYCHIATRY & NEUROLOGY

## 2020-08-17 RX ORDER — METRONIDAZOLE 500 MG/1
TABLET ORAL
COMMUNITY
Start: 2020-07-14 | End: 2020-10-09

## 2020-08-17 RX ORDER — PROCHLORPERAZINE MALEATE 10 MG
10 TABLET ORAL EVERY 8 HOURS PRN
COMMUNITY
Start: 2020-07-14

## 2020-08-17 RX ORDER — LEVOTHYROXINE SODIUM 125 MCG
TABLET ORAL
COMMUNITY
Start: 2020-06-01 | End: 2020-10-08

## 2020-08-17 RX ORDER — DIVALPROEX SODIUM 250 MG/1
TABLET, EXTENDED RELEASE ORAL
Qty: 120 TABLET | Refills: 2 | Status: SHIPPED | OUTPATIENT
Start: 2020-08-17 | End: 2020-10-08

## 2020-08-17 NOTE — PROGRESS NOTES
Subjective:    CC: Mona Cordova is seen today for episodes of confusion    HPI:  Current visit-patient states that she again had 1 of her typical episodes 4 weeks ago.  Once again she denies being sick at the time but had been having nausea after getting dye for a nuclear scan.  Also remains chronically sleep deprived despite taking Seroquel 200 mg nightly.  She states that the episode started with her falling down from the bed after which she started to have numbness in the back of her head followed by a headache.  Then for 3 days she was extremely confused and started having hallucinations like seeing insects on the wall or a mouse waving a flag at her.  Her speech also seemed garbled and she needed help with her daily activities such as bathing because she was extremely weak.  Patient has mild recollection of the events during that time.  Again  denies any shaking or staring off spells.  Her EEG that I had previously obtained showed bifrontal slowing but no epileptiform activity.  I also reviewed the results of her carotid Doppler which showed minimal stenosis bilaterally and echocardiogram that showed a normal EF mild left atrial dilatation with moderate aortic valve sclerosis but no PFO.  She continues to take aspirin 81 mg along with Eliquis 5 mg twice a day.    Initial [id number] female with a past medical history of paroxysmal atrial fibrillation, hypertension, hypothyroidism, stroke, hyperlipidemia, breast cancer status post chemotherapy (in remission since 1999), anxiety, depression presents with episodes of confusion.  Patient had a right caudate nucleus stroke in 2017 with  left-sided weakness.  She underwent a loop recorder placement after that and was found to have atrial flutter/A. fib and started on Eliquis along with aspirin 81 mg last year.  About 3 or 4 years ago she also started to have episodes which were thought to be TIAs.  During these she has a numbness in the back of her head  followed by a headache.  After that she loses awareness and typically finds herself on the floor.  Also has slurred speech.  The confusion can last for days and she has visual hallucinations during the episode.  She has had about 6 such episodes with the last one being 3 weeks ago that was longest in duration and lasted for 4 days.  During that patient was at home and had the numbness as well as the headache.  After that she lost awareness.  Her  found her sitting on the floor, glassy eyed and she was trying to pull all the shoes off the shelf.  Had had urinary incontinence.  For the next 4 days she was confused with generalized weakness, slurring of speech and was seeing things including animals, people and bugs crawling on the walls.  She has no recollection of those days.   denies any automatisms or shaking.  Ever since this episode she has had weakness in both her legs.  She denies being sick during any of the episodes but has chronic insomnia despite taking Seroquel 200 mg at night.  She also states that in the past taking 3 to 4 ounces of vodka with vitamin water followed by sleeping has helped her calm down and get over the episode.  She had a MRI brain 2 days ago that showed chronic ischemic changes as well as a remote right caudate lacunar infarct but no acute abnormalities.  Carotid Doppler, echo were also done the results of which are still pending.  Her blood work showed a white count of 11.1.  TSH was elevated at 13.6 and her Synthroid dose was adjusted.  Lipid panel was as follows-, , LDL 68, HDL 95.  Patient denies having febrile seizures as a child, family history of seizures or any concussions growing up.  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  There is a history of dementia in her paternal grandmother, aunt and uncle.    The following portions of the patient's history were reviewed today and updated as of 06/17/2020  : allergies, current  medications, past family history, past medical history, past social history, past surgical history and problem list  These document will be scanned to patient's chart.      Current Outpatient Medications:   •  apixaban (Eliquis) 5 MG tablet tablet, Take 1 tablet by mouth 2 (Two) Times a Day., Disp: 180 tablet, Rfl: 1  •  aspirin 81 MG EC tablet, Take 1 tablet by mouth Daily., Disp: 90 tablet, Rfl: 3  •  CALCIUM PO, Take 600 mg by mouth Daily., Disp: , Rfl:   •  cetirizine (zyrTEC) 10 MG tablet, Take 10 mg by mouth Daily., Disp: , Rfl:   •  Cyanocobalamin (VITAMIN B-12 PO), Take 1 tablet by mouth Daily., Disp: , Rfl:   •  digoxin (LANOXIN) 250 MCG tablet, Daily., Disp: , Rfl:   •  levothyroxine sodium (TIROSINT) 137 MCG capsule, Take 137 mcg by mouth Daily., Disp: , Rfl:   •  losartan-hydrochlorothiazide (HYZAAR) 100-25 MG per tablet, Take 1 tablet by mouth Daily., Disp: 90 tablet, Rfl: 1  •  metoprolol tartrate (LOPRESSOR) 50 MG tablet, Take 1 tablet by mouth 2 (Two) Times a Day., Disp: 180 tablet, Rfl: 1  •  metroNIDAZOLE (FLAGYL) 500 MG tablet, , Disp: , Rfl:   •  Multiple Vitamin (MULTI-VITAMIN PO), Take 1 tablet by mouth Daily. ICAPS VITAMIN, Disp: , Rfl:   •  ondansetron ODT (ZOFRAN-ODT) 8 MG disintegrating tablet, prn, Disp: , Rfl:   •  pantoprazole (PROTONIX) 40 MG EC tablet, Take 40 mg by mouth 2 (Two) Times a Day., Disp: , Rfl:   •  prochlorperazine (COMPAZINE) 10 MG tablet, , Disp: , Rfl:   •  QUEtiapine (SEROquel) 200 MG tablet, Take 200 mg by mouth Every Night., Disp: , Rfl:   •  raloxifene (EVISTA) 60 MG tablet, Take 60 mg by mouth Daily., Disp: , Rfl:   •  sucralfate (CARAFATE) 1 g tablet, Take 1 g by mouth 2 (Two) Times a Day., Disp: , Rfl:   •  SYNTHROID 125 MCG tablet, , Disp: , Rfl:   •  vilazodone (VIIBRYD) 40 MG tablet tablet, Take 40 mg by mouth Daily., Disp: , Rfl:   •  XIFAXAN 550 MG tablet, Take 550 mg by mouth As Needed., Disp: , Rfl:   •  divalproex (Depakote ER) 250 MG 24 hr tablet, Take 1  "tablet twice a day for 1 week then increase to 2 tablets twice a day thereafter., Disp: 120 tablet, Rfl: 2   Past Medical History:   Diagnosis Date   • Acid reflux    • Atrial fibrillation (CMS/HCC)    • Cancer (CMS/HCC)     BREAST   • Colitis    • Depression    • Depression    • Hypertension    • Stroke (CMS/HCC)    • Thyroid disorder    • TIA (transient ischemic attack)       Past Surgical History:   Procedure Laterality Date   • BREAST SURGERY      BREAST CANCER   • CERVICAL BIOPSY  W/ LOOP ELECTRODE EXCISION     • GASTRIC SLEEVE LAPAROSCOPIC     • HYSTERECTOMY     • REPLACEMENT TOTAL KNEE      BOTH   • VAGOTOMY        Family History   Problem Relation Age of Onset   • Emphysema Mother       Social History     Socioeconomic History   • Marital status:      Spouse name: Not on file   • Number of children: Not on file   • Years of education: Not on file   • Highest education level: Not on file   Tobacco Use   • Smoking status: Never Smoker   • Smokeless tobacco: Never Used   Substance and Sexual Activity   • Alcohol use: Yes     Frequency: Monthly or less     Comment: maria isabel   • Drug use: No   • Sexual activity: Defer     Review of Systems   Neurological: Positive for speech difficulty, weakness and confusion.   Psychiatric/Behavioral: Positive for hallucinations.   All other systems reviewed and are negative.      Objective:    /82   Pulse 52   Temp 97.3 °F (36.3 °C)   Ht 175.3 cm (69\")   Wt 83.2 kg (183 lb 6.4 oz)   SpO2 95%   BMI 27.08 kg/m²     Neurology Exam:    General apperance: NAD.     Mental status: Alert, awake and oriented to time place and person.    Recent and Remote memory: Intact.  Delayed recall of 3/3 today    Attention span and Concentration: Normal.     Language and Speech: Intact- No dysarthria.    Fluency, Naming , Repitition and Comprehension:  Intact    Cranial Nerves:   CN II: Visual fields are full. Intact. Fundi - Normal, No papillederma, Pupils - RAHUL  CN III, IV and " VI: Extraocular movements are intact. Normal saccades.   CN V: Facial sensation is intact.   CN VII: Muscles of facial expression reveal no asymmetry. Intact.   CN VIII: Hearing is intact. Whispered voice intact.   CN IX and X: Palate elevates symmetrically. Intact  CN XI: Shoulder shrug is intact.   CN XII: Tongue is midline without evidence of atrophy or fasciculation.     Ophthalmoscopic exam of optic disc-normal    Motor:  Right UE muscle strength 5/5. Normal tone.     Left UE muscle strength 5/5. Normal tone.      Right LE muscle strength5-/5. Normal tone.     Left LE muscle strength 5-/5. Normal tone.      Sensory: Normal light touch, vibration and pinprick sensation bilaterally.    DTRs: 2+ bilaterally in upper and lower extremities.    Babinski: Negative bilaterally.    Co-ordination: Normal finger-to-nose, heel to shin B/L.    Rhomberg: Negative.    Gait: Normal.    Cardiovascular: Regular rate and rhythm without murmur, gallop or rub.    Assessment and Plan:  1. Episodic confusion  Based on the semiology it is possible that the patient has complex focal seizures with a prolonged postictal phase versus stress-induced spells versus TIAs (less likely)  She is already on aspirin 81 mg along with Eliquis 5 mg twice a day.    MRI brain did not show any acute abnormalities.  EEG showed bifrontal slowing but no epileptiform activity  I will start her on Depakote ER gradually increasing to 500 mg twice a day.  Hopefully that will help with the spells and her mood  I have counseled her on seizure precautions including no driving for 3 months from her last episode        Return in about 6 weeks (around 9/28/2020).         Benita Richter MD

## 2020-09-04 ENCOUNTER — OFFICE VISIT (OUTPATIENT)
Dept: CARDIOLOGY | Facility: CLINIC | Age: 70
End: 2020-09-04

## 2020-09-04 DIAGNOSIS — I48.92 ATRIAL FLUTTER, UNSPECIFIED TYPE (HCC): ICD-10-CM

## 2020-09-04 DIAGNOSIS — I48.0 PAROXYSMAL ATRIAL FIBRILLATION (HCC): ICD-10-CM

## 2020-09-04 PROCEDURE — 93291 INTERROG DEV EVAL SCRMS IP: CPT | Performed by: INTERNAL MEDICINE

## 2020-10-08 ENCOUNTER — OFFICE VISIT (OUTPATIENT)
Dept: NEUROLOGY | Facility: CLINIC | Age: 70
End: 2020-10-08

## 2020-10-08 VITALS
OXYGEN SATURATION: 94 % | SYSTOLIC BLOOD PRESSURE: 106 MMHG | TEMPERATURE: 97.8 F | HEIGHT: 69 IN | BODY MASS INDEX: 28.5 KG/M2 | DIASTOLIC BLOOD PRESSURE: 72 MMHG | WEIGHT: 192.4 LBS | HEART RATE: 81 BPM

## 2020-10-08 DIAGNOSIS — R41.0 EPISODIC CONFUSION: Primary | ICD-10-CM

## 2020-10-08 PROCEDURE — 99213 OFFICE O/P EST LOW 20 MIN: CPT | Performed by: PSYCHIATRY & NEUROLOGY

## 2020-10-08 RX ORDER — DIVALPROEX SODIUM 500 MG/1
500 TABLET, EXTENDED RELEASE ORAL 2 TIMES DAILY
Qty: 180 TABLET | Refills: 3 | Status: SHIPPED | OUTPATIENT
Start: 2020-10-08 | End: 2021-02-08 | Stop reason: SDUPTHER

## 2020-10-08 NOTE — PROGRESS NOTES
Subjective:    CC: Mona Cordova is seen today for episodes of confusion    HPI:  Current visit- patient states that she has not had any of her usual episodes with confusion/hallucinations after starting Depakote ER now at 500 mg twice a day.  In fact her last episode was about 3 months ago in July.  She also denies having any side effects with Depakote.  Has been undergoing physical therapy for lower extremity weakness which she developed after her last episode.    Last episode-patient states that she again had 1 of her typical episodes 4 weeks ago.  Once again she denies being sick at the time but had been having nausea after getting dye for a nuclear scan.  Also remains chronically sleep deprived despite taking Seroquel 200 mg nightly.  She states that the episode started with her falling down from the bed after which she started to have numbness in the back of her head followed by a headache.  Then for 3 days she was extremely confused and started having hallucinations like seeing insects on the wall or a mouse waving a flag at her.  Her speech also seemed garbled and she needed help with her daily activities such as bathing because she was extremely weak.  Patient has mild recollection of the events during that time.  Again  denies any shaking or staring off spells.  Her EEG that I had previously obtained showed bifrontal slowing but no epileptiform activity.  I also reviewed the results of her carotid Doppler which showed minimal stenosis bilaterally and echocardiogram that showed a normal EF mild left atrial dilatation with moderate aortic valve sclerosis but no PFO.  She continues to take aspirin 81 mg along with Eliquis 5 mg twice a day.    Initial [id number]-old female with a past medical history of paroxysmal atrial fibrillation, hypertension, hypothyroidism, stroke, hyperlipidemia, breast cancer status post chemotherapy (in remission since 1999), anxiety, depression presents with episodes of  confusion.  Patient had a right caudate nucleus stroke in 2017 with  left-sided weakness.  She underwent a loop recorder placement after that and was found to have atrial flutter/A. fib and started on Eliquis along with aspirin 81 mg last year.  About 3 or 4 years ago she also started to have episodes which were thought to be TIAs.  During these she has a numbness in the back of her head followed by a headache.  After that she loses awareness and typically finds herself on the floor.  Also has slurred speech.  The confusion can last for days and she has visual hallucinations during the episode.  She has had about 6 such episodes with the last one being 3 weeks ago that was longest in duration and lasted for 4 days.  During that patient was at home and had the numbness as well as the headache.  After that she lost awareness.  Her  found her sitting on the floor, glassy eyed and she was trying to pull all the shoes off the shelf.  Had had urinary incontinence.  For the next 4 days she was confused with generalized weakness, slurring of speech and was seeing things including animals, people and bugs crawling on the walls.  She has no recollection of those days.   denies any automatisms or shaking.  Ever since this episode she has had weakness in both her legs.  She denies being sick during any of the episodes but has chronic insomnia despite taking Seroquel 200 mg at night.  She also states that in the past taking 3 to 4 ounces of vodka with vitamin water followed by sleeping has helped her calm down and get over the episode.  She had a MRI brain 2 days ago that showed chronic ischemic changes as well as a remote right caudate lacunar infarct but no acute abnormalities.  Carotid Doppler, echo were also done the results of which are still pending.  Her blood work showed a white count of 11.1.  TSH was elevated at 13.6 and her Synthroid dose was adjusted.  Lipid panel was as follows-, , LDL 68,  HDL 95.  Patient denies having febrile seizures as a child, family history of seizures or any concussions growing up.  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  There is a history of dementia in her paternal grandmother, aunt and uncle.    The following portions of the patient's history were reviewed today and updated as of 06/17/2020  : allergies, current medications, past family history, past medical history, past social history, past surgical history and problem list  These document will be scanned to patient's chart.      Current Outpatient Medications:   •  apixaban (Eliquis) 5 MG tablet tablet, Take 1 tablet by mouth 2 (Two) Times a Day., Disp: 180 tablet, Rfl: 1  •  aspirin 81 MG EC tablet, Take 1 tablet by mouth Daily., Disp: 90 tablet, Rfl: 3  •  CALCIUM PO, Take 600 mg by mouth Daily., Disp: , Rfl:   •  cetirizine (zyrTEC) 10 MG tablet, Take 10 mg by mouth Daily., Disp: , Rfl:   •  Cyanocobalamin (VITAMIN B-12 PO), Take 1 tablet by mouth Daily., Disp: , Rfl:   •  digoxin (LANOXIN) 250 MCG tablet, Daily., Disp: , Rfl:   •  levothyroxine sodium (TIROSINT) 137 MCG capsule, Take 137 mcg by mouth Daily., Disp: , Rfl:   •  losartan-hydrochlorothiazide (HYZAAR) 100-25 MG per tablet, Take 1 tablet by mouth Daily., Disp: 90 tablet, Rfl: 1  •  metoprolol tartrate (LOPRESSOR) 50 MG tablet, Take 1 tablet by mouth 2 (Two) Times a Day., Disp: 180 tablet, Rfl: 1  •  metroNIDAZOLE (FLAGYL) 500 MG tablet, , Disp: , Rfl:   •  Multiple Vitamin (MULTI-VITAMIN PO), Take 1 tablet by mouth Daily. ICAPS VITAMIN, Disp: , Rfl:   •  ondansetron ODT (ZOFRAN-ODT) 8 MG disintegrating tablet, prn, Disp: , Rfl:   •  pantoprazole (PROTONIX) 40 MG EC tablet, Take 40 mg by mouth 2 (Two) Times a Day., Disp: , Rfl:   •  prochlorperazine (COMPAZINE) 10 MG tablet, , Disp: , Rfl:   •  QUEtiapine (SEROquel) 200 MG tablet, Take 200 mg by mouth Every Night., Disp: , Rfl:   •  raloxifene (EVISTA) 60 MG tablet, Take 60 mg by  "mouth Daily., Disp: , Rfl:   •  sucralfate (CARAFATE) 1 g tablet, Take 1 g by mouth 2 (Two) Times a Day., Disp: , Rfl:   •  vilazodone (VIIBRYD) 40 MG tablet tablet, Take 40 mg by mouth Daily., Disp: , Rfl:   •  XIFAXAN 550 MG tablet, Take 550 mg by mouth As Needed., Disp: , Rfl:   •  divalproex (DEPAKOTE) 500 MG 24 hr tablet, Take 1 tablet by mouth 2 (two) times a day., Disp: 180 tablet, Rfl: 3   Past Medical History:   Diagnosis Date   • Acid reflux    • Atrial fibrillation (CMS/HCC)    • Cancer (CMS/HCC)     BREAST   • Colitis    • Depression    • Depression    • Hypertension    • Stroke (CMS/HCC)    • Thyroid disorder    • TIA (transient ischemic attack)       Past Surgical History:   Procedure Laterality Date   • BREAST SURGERY      BREAST CANCER   • CERVICAL BIOPSY  W/ LOOP ELECTRODE EXCISION     • GASTRIC SLEEVE LAPAROSCOPIC     • HYSTERECTOMY     • REPLACEMENT TOTAL KNEE      BOTH   • VAGOTOMY        Family History   Problem Relation Age of Onset   • Emphysema Mother       Social History     Socioeconomic History   • Marital status:      Spouse name: Not on file   • Number of children: Not on file   • Years of education: Not on file   • Highest education level: Not on file   Tobacco Use   • Smoking status: Never Smoker   • Smokeless tobacco: Never Used   Substance and Sexual Activity   • Alcohol use: Yes     Frequency: Monthly or less     Comment: maria isabel   • Drug use: No   • Sexual activity: Defer     Review of Systems   Neurological: Positive for weakness.   All other systems reviewed and are negative.      Objective:    /72   Pulse 81   Temp 97.8 °F (36.6 °C)   Ht 175.3 cm (69.02\")   Wt 87.3 kg (192 lb 6.4 oz)   SpO2 94%   BMI 28.40 kg/m²     Neurology Exam:    General apperance: NAD.     Mental status: Alert, awake and oriented to time place and person.    Recent and Remote memory: Intact.  Delayed recall of 3/3 today    Attention span and Concentration: Normal.     Language and Speech: " Intact- No dysarthria.    Fluency, Naming , Repitition and Comprehension:  Intact    Cranial Nerves:   CN II: Visual fields are full. Intact. Fundi - Normal, No papillederma, Pupils - RAHUL  CN III, IV and VI: Extraocular movements are intact. Normal saccades.   CN V: Facial sensation is intact.   CN VII: Muscles of facial expression reveal no asymmetry. Intact.   CN VIII: Hearing is intact. Whispered voice intact.   CN IX and X: Palate elevates symmetrically. Intact  CN XI: Shoulder shrug is intact.   CN XII: Tongue is midline without evidence of atrophy or fasciculation.     Ophthalmoscopic exam of optic disc-normal    Motor:  Right UE muscle strength 5/5. Normal tone.     Left UE muscle strength 5/5. Normal tone.      Right LE muscle strength5-/5. Normal tone.     Left LE muscle strength 5-/5. Normal tone.      Sensory: Normal light touch, vibration and pinprick sensation bilaterally.    DTRs: 2+ bilaterally in upper and lower extremities.    Babinski: Negative bilaterally.    Co-ordination: Normal finger-to-nose, heel to shin B/L.    Rhomberg: Negative.    Gait: Normal.    Cardiovascular: Regular rate and rhythm without murmur, gallop or rub.    Assessment and Plan:  1. Episodic confusion  Based on the semiology it is possible that the patient has complex focal seizures with a prolonged postictal phase versus stress-induced spells  She is already on aspirin 81 mg along with Eliquis 5 mg twice a day.    MRI brain did not show any acute abnormalities.  EEG showed bifrontal slowing but no epileptiform activity  I have asked her to continue Depakote ER  500 mg twice a day.  Hopefully that will help with the spells and her mood  Patient can resume driving in 2 days as it will be 90 days from her last episode        Return in about 4 months (around 2/8/2021).         Benita Richter MD

## 2020-10-09 ENCOUNTER — OFFICE VISIT (OUTPATIENT)
Dept: CARDIOLOGY | Facility: CLINIC | Age: 70
End: 2020-10-09

## 2020-10-09 VITALS
BODY MASS INDEX: 28.29 KG/M2 | DIASTOLIC BLOOD PRESSURE: 67 MMHG | HEIGHT: 69 IN | SYSTOLIC BLOOD PRESSURE: 105 MMHG | WEIGHT: 191 LBS | HEART RATE: 69 BPM | OXYGEN SATURATION: 98 %

## 2020-10-09 DIAGNOSIS — Z86.73 HISTORY OF CVA (CEREBROVASCULAR ACCIDENT): ICD-10-CM

## 2020-10-09 DIAGNOSIS — I10 ESSENTIAL HYPERTENSION: ICD-10-CM

## 2020-10-09 DIAGNOSIS — E78.2 MIXED HYPERLIPIDEMIA: ICD-10-CM

## 2020-10-09 DIAGNOSIS — I48.0 PAROXYSMAL ATRIAL FIBRILLATION (HCC): ICD-10-CM

## 2020-10-09 DIAGNOSIS — R07.2 PRECORDIAL PAIN: ICD-10-CM

## 2020-10-09 DIAGNOSIS — G45.9 TIA (TRANSIENT ISCHEMIC ATTACK): ICD-10-CM

## 2020-10-09 DIAGNOSIS — I48.92 ATRIAL FLUTTER, UNSPECIFIED TYPE (HCC): Primary | ICD-10-CM

## 2020-10-09 PROCEDURE — 99213 OFFICE O/P EST LOW 20 MIN: CPT | Performed by: SPECIALIST

## 2020-10-09 NOTE — PATIENT INSTRUCTIONS
Obesity, Adult  Obesity is the condition of having too much total body fat. Being overweight or obese means that your weight is greater than what is considered healthy for your body size. Obesity is determined by a measurement called BMI. BMI is an estimate of body fat and is calculated from height and weight. For adults, a BMI of 30 or higher is considered obese.  Obesity can lead to other health concerns and major illnesses, including:  · Stroke.  · Coronary artery disease (CAD).  · Type 2 diabetes.  · Some types of cancer, including cancers of the colon, breast, uterus, and gallbladder.  · Osteoarthritis.  · High blood pressure (hypertension).  · High cholesterol.  · Sleep apnea.  · Gallbladder stones.  · Infertility problems.  What are the causes?  Common causes of this condition include:  · Eating daily meals that are high in calories, sugar, and fat.  · Being born with genes that may make you more likely to become obese.  · Having a medical condition that causes obesity, including:  ? Hypothyroidism.  ? Polycystic ovarian syndrome (PCOS).  ? Binge-eating disorder.  ? Cushing syndrome.  · Taking certain medicines, such as steroids, antidepressants, and seizure medicines.  · Not being physically active (sedentary lifestyle).  · Not getting enough sleep.  · Drinking high amounts of sugar-sweetened beverages, such as soft drinks.  What increases the risk?  The following factors may make you more likely to develop this condition:  · Having a family history of obesity.  · Being a woman of  descent.  · Being a man of  descent.  · Living in an area with limited access to:  ? [place], recreation centers, or sidewalks.  ? Healthy food choices, such as grocery stores and farmers' markets.  What are the signs or symptoms?  The main sign of this condition is having too much body fat.  How is this diagnosed?  This condition is diagnosed based on:  · Your BMI. If you are an adult with a BMI of 30 or  higher, you are considered obese.  · Your waist circumference. This measures the distance around your waistline.  · Your skinfold thickness. Your health care provider may gently pinch a fold of your skin and measure it.  You may have other tests to check for underlying conditions.  How is this treated?  Treatment for this condition often includes changing your lifestyle. Treatment may include some or all of the following:  · Dietary changes. This may include developing a healthy meal plan.  · Regular physical activity. This may include activity that causes your heart to beat faster (aerobic exercise) and strength training. Work with your health care provider to design an exercise program that works for you.  · Medicine to help you lose weight if you are unable to lose 1 pound a week after 6 weeks of healthy eating and more physical activity.  · Treating conditions that cause the obesity (underlying conditions).  · Surgery. Surgical options may include gastric banding and gastric bypass. Surgery may be done if:  ? Other treatments have not helped to improve your condition.  ? You have a BMI of 40 or higher.  ? You have life-threatening health problems related to obesity.  Follow these instructions at home:  Eating and drinking    · Follow recommendations from your health care provider about what you eat and drink. Your health care provider may advise you to:  ? Limit fast food, sweets, and processed snack foods.  ? Choose low-fat options, such as low-fat milk instead of whole milk.  ? Eat 5 or more servings of fruits or vegetables every day.  ? Eat at home more often. This gives you more control over what you eat.  ? Choose healthy foods when you eat out.  ? Learn to read food labels. This will help you understand how much food is considered 1 serving.  ? Learn what a healthy serving size is.  ? Keep low-fat snacks available.  ? Limit sugary drinks, such as soda, fruit juice, sweetened iced tea, and flavored  milk.  · Drink enough water to keep your urine pale yellow.  · Do not follow a fad diet. Fad diets can be unhealthy and even dangerous.  Physical activity  · Exercise regularly, as told by your health care provider.  ? Most adults should get up to 150 minutes of moderate-intensity exercise every week.  ? Ask your health care provider what types of exercise are safe for you and how often you should exercise.  · Warm up and stretch before being active.  · Cool down and stretch after being active.  · Rest between periods of activity.  Lifestyle  · Work with your health care provider and a dietitian to set a weight-loss goal that is healthy and reasonable for you.  · Limit your screen time.  · Find ways to reward yourself that do not involve food.  · Do not drink alcohol if:  ? Your health care provider tells you not to drink.  ? You are pregnant, may be pregnant, or are planning to become pregnant.  · If you drink alcohol:  ? Limit how much you use to:  § 0-1 drink a day for women.  § 0-2 drinks a day for men.  ? Be aware of how much alcohol is in your drink. In the [place] one drink equals one 12 oz bottle of beer (355 mL), one 5 oz glass of wine (148 mL), or one 1½ oz glass of hard liquor (44 mL).  General instructions  · Keep a weight-loss journal to keep track of the food you eat and how much exercise you get.  · Take over-the-counter and prescription medicines only as told by your health care provider.  · Take vitamins and supplements only as told by your health care provider.  · Consider joining a support group. Your health care provider may be able to recommend a support group.  · Keep all follow-up visits as told by your health care provider. This is important.  Contact a health care provider if:  · You are unable to meet your weight loss goal after 6 weeks of dietary and lifestyle changes.  Get help right away if you are having:  · Trouble breathing.  · Suicidal thoughts or behaviors.  Summary  · Obesity is the  condition of having too much total body fat.  · Being overweight or obese means that your weight is greater than what is considered healthy for your body size.  · Work with your health care provider and a dietitian to set a weight-loss goal that is healthy and reasonable for you.  · Exercise regularly, as told by your health care provider. Ask your health care provider what types of exercise are safe for you and how often you should exercise.  This information is not intended to replace advice given to you by your health care provider. Make sure you discuss any questions you have with your health care provider.  Document Released: 01/25/2006 Document Revised: 08/22/2019 Document Reviewed: 08/22/2019  [employer] Patient Education © 2020 [employer]  MyPlate from [employer]    MyPlate is an outline of a general healthy diet based on the 2010 Dietary Guidelines for Americans, from the [place] Department of Agriculture (USDA). It sets guidelines for how much food you should eat from each food group based on your age, sex, and level of physical activity.  What are tips for following MyPlate?  To follow MyPlate recommendations:  · Eat a wide variety of fruits and vegetables, grains, and protein foods.  · Serve smaller portions and eat less food throughout the day.  · Limit portion sizes to avoid overeating.  · Enjoy your food.  · Get at least 150 minutes of exercise every week. This is about 30 minutes each day, 5 or more days per week.  It can be difficult to have every meal look like MyPlate. Think about MyPlate as eating guidelines for an entire day, rather than each individual meal.  Fruits and vegetables  · Make half of your plate fruits and vegetables.  · Eat many different colors of fruits and vegetables each day.  · For a 2,000 calorie daily food plan, eat:  ? 2½ cups of vegetables every day.  ? 2 cups of fruit every day.  · 1 cup is equal to:  ? 1 cup raw or cooked vegetables.  ? 1 cup raw fruit.  ? 1 medium-sized orange,  apple, or banana.  ? 1 cup 100% fruit or vegetable juice.  ? 2 cups raw leafy greens, such as lettuce, spinach, or kale.  ? ½ cup dried fruit.  Grains  · One fourth of your plate should be grains.  · Make at least half of the grains you eat each day whole grains.  · For a 2,000 calorie daily food plan, eat 6 oz of grains every day.  · 1 oz is equal to:  ? 1 slice bread.  ? 1 cup cereal.  ? ½ cup cooked rice, cereal, or pasta.  Protein  · One fourth of your plate should be protein.  · Eat a wide variety of protein foods, including meat, poultry, fish, eggs, beans, nuts, and tofu.  · For a 2,000 calorie daily food plan, eat 5½ oz of protein every day.  · 1 oz is equal to:  ? 1 oz meat, poultry, or fish.  ? ¼ cup cooked beans.  ? 1 egg.  ? ½ oz nuts or seeds.  ? 1 Tbsp peanut butter.  Dairy  · Drink fat-free or low-fat (1%) milk.  · Eat or drink dairy as a side to meals.  · For a 2,000 calorie daily food plan, eat or drink 3 cups of dairy every day.  · 1 cup is equal to:  ? 1 cup milk, yogurt, cottage cheese, or soy milk (soy beverage).  ? 2 oz processed cheese.  ? 1½ oz natural cheese.  Fats, oils, salt, and sugars  · Only small amounts of oils are recommended.  · Avoid foods that are high in calories and low in nutritional value (empty calories), like foods high in fat or added sugars.  · Choose foods that are low in salt (sodium). Choose foods that have less than 140 milligrams (mg) of sodium per serving.  · Drink water instead of sugary drinks. Drink enough water each day to keep your urine pale yellow.  Where to find support  · Work with your health care provider or a nutrition specialist (dietitian) to develop a customized eating plan that is right for you.  · Download an jcarlos (mobile application) to help you track your daily food intake.  Where to find more information  · Go to ChooseMyPlate.gov for more information.  Summary  · MyPlate is a general guideline for healthy eating from the USDA. It is based on the  2010 Dietary Guidelines for Americans.  · In general, fruits and vegetables should take up ½ of your plate, grains should take up ¼ of your plate, and protein should take up ¼ of your plate.  This information is not intended to replace advice given to you by your health care provider. Make sure you discuss any questions you have with your health care provider.  Document Released: 01/06/2009 Document Revised: 05/21/2020 Document Reviewed: 03/19/2018  [employer] Patient Education © 2020 [employer]

## 2020-10-09 NOTE — PROGRESS NOTES
Subjective   Follow up, paroxysmal atrial fibrilation  Mona Cordova is a 70 y.o. female who presents to day for Atrial Fibrillation (Here for f/u), Atrial Flutter, Hyperlipidemia, Hypertension, and Transient Ischemic Attack.    CHIEF COMPLIANT  Chief Complaint   Patient presents with   • Atrial Fibrillation     Here for f/u   • Atrial Flutter   • Hyperlipidemia   • Hypertension   • Transient Ischemic Attack     1. Problem list:  Tachycardia (Atrial flutter vs SVT)  a. Echocardiogram 8/29/18: EF 60-65%, trace MR, mild TR  b. Stress test 8/29/18: No evidence of ischemia, EF 61%  c. [facility] loop recorder implant, 10/2018, Dr. Mike will. Atrial flutter vs SVT detected on loop recorder interrogation 08/2019  e. CHADSVASc = 5 (female, age, hx of CVA, HTN), on Eliquis  f. Echo, 6-, EF 56-60%, grade 2 DD, mild TR, pulm. Pressures 37 mmHg, neg. Bubble for rt. To lt shunting  g. Stress test, 6-, no ischemia  2. Hypertension  3. Hyperlipidemia  4. Hypothyroidism  5. Hx of  Multiple CVA- first in 2017 with history TPA  (Cryptogenic Stroke) - with deficits of memory impairment and left sided   weakness.   5.1 Carotid u/s, 6-, < 50% stenosis bilat.   5.2 MRI brain, 6-, no acute infarct, rempote lacunar infarct in rt. Basal ganglia  6. GERD  7. Asthma  8. Negative sleep study      Problem List Items Addressed This Visit        Cardiovascular and Mediastinum    Essential hypertension    Atrial flutter (CMS/HCC) - Primary    Hyperlipidemia    Paroxysmal atrial fibrillation (CMS/HCC)    TIA (transient ischemic attack)       Nervous and Auditory    Precordial pain       Other    History of CVA (cerebrovascular accident)          HPI    She is doing really well she still have occasional palpitations but usually very brief no further dizziness no seizure she is following by a neurologist in [place] who adjusted her seizure medications and for the last 3 months has been doing great no chest pain or  shortness of breath she has mild intermittent edema                PRIOR MEDS  Current Outpatient Medications on File Prior to Visit   Medication Sig Dispense Refill   • apixaban (Eliquis) 5 MG tablet tablet Take 1 tablet by mouth 2 (Two) Times a Day. 180 tablet 1   • aspirin 81 MG EC tablet Take 1 tablet by mouth Daily. 90 tablet 3   • CALCIUM PO Take 600 mg by mouth Daily.     • cetirizine (zyrTEC) 10 MG tablet Take 10 mg by mouth Daily.     • Cyanocobalamin (VITAMIN B-12 PO) Take 1 tablet by mouth Daily.     • digoxin (LANOXIN) 250 MCG tablet Daily.     • divalproex (DEPAKOTE) 500 MG 24 hr tablet Take 1 tablet by mouth 2 (two) times a day. 180 tablet 3   • levothyroxine sodium (TIROSINT) 137 MCG capsule Take 137 mcg by mouth Daily.     • losartan-hydrochlorothiazide (HYZAAR) 100-25 MG per tablet Take 1 tablet by mouth Daily. 90 tablet 1   • metoprolol tartrate (LOPRESSOR) 50 MG tablet Take 1 tablet by mouth 2 (Two) Times a Day. 180 tablet 1   • Multiple Vitamin (MULTI-VITAMIN PO) Take 1 tablet by mouth Daily. ICAPS VITAMIN     • ondansetron ODT (ZOFRAN-ODT) 8 MG disintegrating tablet prn     • pantoprazole (PROTONIX) 40 MG EC tablet Take 40 mg by mouth 2 (Two) Times a Day.     • prochlorperazine (COMPAZINE) 10 MG tablet 10 mg Every 8 (Eight) Hours As Needed.     • QUEtiapine (SEROquel) 200 MG tablet Take 200 mg by mouth Every Night.     • raloxifene (EVISTA) 60 MG tablet Take 60 mg by mouth Daily.     • sucralfate (CARAFATE) 1 g tablet Take 1 g by mouth 2 (Two) Times a Day.     • vilazodone (VIIBRYD) 40 MG tablet tablet Take 40 mg by mouth Daily.     • XIFAXAN 550 MG tablet Take 550 mg by mouth As Needed.     • [DISCONTINUED] metroNIDAZOLE (FLAGYL) 500 MG tablet        No current facility-administered medications on file prior to visit.        ALLERGIES  Morphine    HISTORY  Past Medical History:   Diagnosis Date   • Acid reflux    • Atrial fibrillation (CMS/HCC)    • Cancer (CMS/HCC)     BREAST   • Colitis    •  "Depression    • Depression    • Hypertension    • Stroke (CMS/HCC)    • Thyroid disorder    • TIA (transient ischemic attack)        Social History     Socioeconomic History   • Marital status:      Spouse name: Not on file   • Number of children: Not on file   • Years of education: Not on file   • Highest education level: Not on file   Tobacco Use   • Smoking status: Never Smoker   • Smokeless tobacco: Never Used   Substance and Sexual Activity   • Alcohol use: Yes     Frequency: Monthly or less     Comment: rarchar   • Drug use: No   • Sexual activity: Defer       Family History   Problem Relation Age of Onset   • Emphysema Mother        Review of Systems   Constitutional: Positive for activity change and fatigue (improving). Negative for appetite change.   HENT: Positive for nosebleeds (eval. by ENT). Negative for congestion.    Eyes: Positive for visual disturbance (wears glasses). Negative for discharge and itching.   Respiratory: Negative for apnea, cough, choking, chest tightness, shortness of breath (\" a little\"), wheezing and stridor.    Cardiovascular: Positive for palpitations (HX a-fib/flutter) and leg swelling. Negative for chest pain (\" off and on, a little\").   Gastrointestinal: Negative.  Negative for abdominal distention and blood in stool (no melena,hematuria,hemoptysis,hematochezia).   Endocrine: Negative.  Negative for cold intolerance and heat intolerance.   Genitourinary: Negative.  Negative for flank pain.   Musculoskeletal: Positive for arthralgias and myalgias.        Having PT for legs   Skin: Negative.  Negative for color change.   Allergic/Immunologic: Positive for environmental allergies.   Neurological: Negative.  Negative for dizziness and facial asymmetry.   Hematological: Bruises/bleeds easily.   Psychiatric/Behavioral: Negative.  Negative for agitation and behavioral problems.       Objective     VITALS: /67 (BP Location: Left arm, Patient Position: Sitting)   Pulse 69  " " Ht 175.3 cm (69.02\")   Wt 86.6 kg (191 lb)   SpO2 98%   BMI 28.19 kg/m²     LABS:   Lab Results (most recent)     None          IMAGING:   No Images in the past 120 days found..    EXAM:  Physical Exam  Vitals signs reviewed.   Constitutional:       Appearance: She is well-developed.   HENT:      Head: Normocephalic and atraumatic.   Eyes:      Pupils: Pupils are equal, round, and reactive to light.   Neck:      Musculoskeletal: Neck supple.      Thyroid: No thyromegaly.      Vascular: No JVD.   Cardiovascular:      Rate and Rhythm: Normal rate and regular rhythm.      Heart sounds: Normal heart sounds. No murmur. No friction rub. No gallop.    Pulmonary:      Effort: Pulmonary effort is normal. No respiratory distress.      Breath sounds: Normal breath sounds. No stridor. No wheezing or rales.   Chest:      Chest wall: No tenderness.   Musculoskeletal:         General: No tenderness or deformity.   Skin:     General: Skin is warm and dry.   Neurological:      Mental Status: She is alert and oriented to person, place, and time.      Cranial Nerves: No cranial nerve deficit.      Coordination: Coordination normal.         Procedure   Procedures       Assessment/Plan    Diagnosis Plan   1. Atrial flutter, unspecified type (CMS/HCC)     2. Essential hypertension     3. Mixed hyperlipidemia     4. Paroxysmal atrial fibrillation (CMS/HCC)     5. TIA (transient ischemic attack)     6. Precordial pain     7. History of CVA (cerebrovascular accident)     1.  She has been doing really well she maintaining sinus rhythm will continue current management  2.  Her blood pressures well controlled we will continue the current management  3.  No labs available she said that she had labs recently at Dr. Serrano's office and she was told that she has hypo-natremia the results of which is not available to me this could be potentially related to possibilities including hypothyroidism on medications polydipsia patient to follow-up " with Dr. Pineda  4.  Patient is now being monitored by neurologist regarding his seizure no seizures for the last 3 months  5.  Continue with anticoagulation    No follow-ups on file.    Mona was seen today for atrial fibrillation, atrial flutter, hyperlipidemia, hypertension and transient ischemic attack.    Diagnoses and all orders for this visit:    Atrial flutter, unspecified type (CMS/HCC)    Essential hypertension    Mixed hyperlipidemia    Paroxysmal atrial fibrillation (CMS/HCC)    TIA (transient ischemic attack)    Precordial pain    History of CVA (cerebrovascular accident)                 MEDS ORDERED DURING VISIT:  No orders of the defined types were placed in this encounter.        This document has been electronically signed by Francesco Khan MD  October 9, 2020 09:50 EDT

## 2021-01-15 ENCOUNTER — OFFICE VISIT (OUTPATIENT)
Dept: CARDIOLOGY | Facility: CLINIC | Age: 71
End: 2021-01-15

## 2021-01-15 DIAGNOSIS — G45.9 TIA (TRANSIENT ISCHEMIC ATTACK): ICD-10-CM

## 2021-01-15 DIAGNOSIS — I48.0 PAROXYSMAL ATRIAL FIBRILLATION (HCC): ICD-10-CM

## 2021-01-15 DIAGNOSIS — I48.92 ATRIAL FLUTTER, UNSPECIFIED TYPE (HCC): ICD-10-CM

## 2021-01-15 DIAGNOSIS — Z86.73 HISTORY OF CVA (CEREBROVASCULAR ACCIDENT): ICD-10-CM

## 2021-01-15 PROCEDURE — 93291 INTERROG DEV EVAL SCRMS IP: CPT | Performed by: SPECIALIST

## 2021-01-18 ENCOUNTER — TELEPHONE (OUTPATIENT)
Dept: CARDIOLOGY | Facility: CLINIC | Age: 71
End: 2021-01-18

## 2021-01-18 DIAGNOSIS — Z86.73 HISTORY OF CVA (CEREBROVASCULAR ACCIDENT): ICD-10-CM

## 2021-01-18 DIAGNOSIS — I47.1 PAROXYSMAL SVT (SUPRAVENTRICULAR TACHYCARDIA) (HCC): ICD-10-CM

## 2021-01-18 DIAGNOSIS — I48.92 ATRIAL FLUTTER, UNSPECIFIED TYPE (HCC): Primary | ICD-10-CM

## 2021-01-18 DIAGNOSIS — G45.9 TIA (TRANSIENT ISCHEMIC ATTACK): ICD-10-CM

## 2021-01-18 DIAGNOSIS — I48.0 PAROXYSMAL ATRIAL FIBRILLATION (HCC): ICD-10-CM

## 2021-01-18 NOTE — TELEPHONE ENCOUNTER
PATIENT HAD LOOP IMPLANT INTERROGATED ON Friday, 1-, AND DEVICE AT EOL. DISCUSSED WITH DR. JUNG AND V/O RECEIVED TO SCHEDULE EXPLANT [place] DR. RIGGINS, CALLED AND DISCUSSED WITH MRS. POOLE AND SHE IS FINE WITH THIS. AWARE OUR OFFICE WILL BE CALLING HER IN A DAY OR SO WITH DATE AND TIME. VERBALIZED OK. PH,LPN

## 2021-01-19 ENCOUNTER — IMMUNIZATION (OUTPATIENT)
Dept: VACCINE CLINIC | Facility: HOSPITAL | Age: 71
End: 2021-01-19

## 2021-01-19 PROCEDURE — 0001A: CPT | Performed by: FAMILY MEDICINE

## 2021-01-19 PROCEDURE — 91300 HC SARSCOV02 VAC 30MCG/0.3ML IM: CPT | Performed by: FAMILY MEDICINE

## 2021-02-01 ENCOUNTER — OUTSIDE FACILITY SERVICE (OUTPATIENT)
Dept: CARDIOLOGY | Facility: CLINIC | Age: 71
End: 2021-02-01

## 2021-02-02 ENCOUNTER — LAB (OUTPATIENT)
Dept: LAB | Facility: HOSPITAL | Age: 71
End: 2021-02-02

## 2021-02-02 DIAGNOSIS — I48.92 ATRIAL FLUTTER, UNSPECIFIED TYPE (HCC): ICD-10-CM

## 2021-02-02 DIAGNOSIS — E78.2 MIXED HYPERLIPIDEMIA: ICD-10-CM

## 2021-02-02 LAB
ALBUMIN SERPL-MCNC: 3.53 G/DL (ref 3.5–5.2)
ALBUMIN/GLOB SERPL: 1.1 G/DL
ALP SERPL-CCNC: 90 U/L (ref 39–117)
ALT SERPL W P-5'-P-CCNC: 14 U/L (ref 1–33)
ANION GAP SERPL CALCULATED.3IONS-SCNC: 14.8 MMOL/L (ref 5–15)
AST SERPL-CCNC: 23 U/L (ref 1–32)
BILIRUB SERPL-MCNC: 0.5 MG/DL (ref 0–1.2)
BUN SERPL-MCNC: 10 MG/DL (ref 8–23)
BUN/CREAT SERPL: 12.7 (ref 7–25)
CALCIUM SPEC-SCNC: 9.2 MG/DL (ref 8.6–10.5)
CHLORIDE SERPL-SCNC: 98 MMOL/L (ref 98–107)
CHOLEST SERPL-MCNC: 183 MG/DL (ref 0–200)
CO2 SERPL-SCNC: 24.2 MMOL/L (ref 22–29)
CREAT SERPL-MCNC: 0.79 MG/DL (ref 0.57–1)
DEPRECATED RDW RBC AUTO: 52.8 FL (ref 37–54)
DIGOXIN SERPL-MCNC: <0.3 NG/ML (ref 0.6–1.2)
ERYTHROCYTE [DISTWIDTH] IN BLOOD BY AUTOMATED COUNT: 13.2 % (ref 12.3–15.4)
GFR SERPL CREATININE-BSD FRML MDRD: 72 ML/MIN/1.73
GFR SERPL CREATININE-BSD FRML MDRD: 87 ML/MIN/1.73
GLOBULIN UR ELPH-MCNC: 3.3 GM/DL
GLUCOSE SERPL-MCNC: 92 MG/DL (ref 65–99)
HCT VFR BLD AUTO: 44.8 % (ref 34–46.6)
HDLC SERPL-MCNC: 82 MG/DL (ref 40–60)
HGB BLD-MCNC: 14.2 G/DL (ref 12–15.9)
LDLC SERPL CALC-MCNC: 83 MG/DL (ref 0–100)
LDLC/HDLC SERPL: 0.98 {RATIO}
MCH RBC QN AUTO: 34 PG (ref 26.6–33)
MCHC RBC AUTO-ENTMCNC: 31.7 G/DL (ref 31.5–35.7)
MCV RBC AUTO: 107.2 FL (ref 79–97)
PLATELET # BLD AUTO: 266 10*3/MM3 (ref 140–450)
PMV BLD AUTO: 10.5 FL (ref 6–12)
POTASSIUM SERPL-SCNC: 4.4 MMOL/L (ref 3.5–5.2)
PROT SERPL-MCNC: 6.8 G/DL (ref 6–8.5)
RBC # BLD AUTO: 4.18 10*6/MM3 (ref 3.77–5.28)
SODIUM SERPL-SCNC: 137 MMOL/L (ref 136–145)
TRIGL SERPL-MCNC: 105 MG/DL (ref 0–150)
VLDLC SERPL-MCNC: 18 MG/DL (ref 5–40)
WBC # BLD AUTO: 7.3 10*3/MM3 (ref 3.4–10.8)

## 2021-02-02 PROCEDURE — 80162 ASSAY OF DIGOXIN TOTAL: CPT

## 2021-02-02 PROCEDURE — 80061 LIPID PANEL: CPT

## 2021-02-02 PROCEDURE — 80053 COMPREHEN METABOLIC PANEL: CPT

## 2021-02-02 PROCEDURE — 85027 COMPLETE CBC AUTOMATED: CPT

## 2021-02-02 PROCEDURE — 36415 COLL VENOUS BLD VENIPUNCTURE: CPT

## 2021-02-04 ENCOUNTER — OUTSIDE FACILITY SERVICE (OUTPATIENT)
Dept: CARDIOLOGY | Facility: CLINIC | Age: 71
End: 2021-02-04

## 2021-02-04 DIAGNOSIS — I48.0 PAROXYSMAL ATRIAL FIBRILLATION (HCC): ICD-10-CM

## 2021-02-04 DIAGNOSIS — G45.9 TIA (TRANSIENT ISCHEMIC ATTACK): ICD-10-CM

## 2021-02-04 DIAGNOSIS — I48.92 ATRIAL FLUTTER, UNSPECIFIED TYPE (HCC): ICD-10-CM

## 2021-02-04 DIAGNOSIS — Z86.73 HISTORY OF CVA (CEREBROVASCULAR ACCIDENT): ICD-10-CM

## 2021-02-04 DIAGNOSIS — I47.1 PAROXYSMAL SVT (SUPRAVENTRICULAR TACHYCARDIA) (HCC): ICD-10-CM

## 2021-02-04 PROCEDURE — 33286 RMVL SUBQ CAR RHYTHM MNTR: CPT | Performed by: INTERNAL MEDICINE

## 2021-02-08 ENCOUNTER — OFFICE VISIT (OUTPATIENT)
Dept: NEUROLOGY | Facility: CLINIC | Age: 71
End: 2021-02-08

## 2021-02-08 VITALS
HEART RATE: 91 BPM | WEIGHT: 184.5 LBS | BODY MASS INDEX: 27.33 KG/M2 | TEMPERATURE: 96.9 F | DIASTOLIC BLOOD PRESSURE: 84 MMHG | SYSTOLIC BLOOD PRESSURE: 126 MMHG | HEIGHT: 69 IN | OXYGEN SATURATION: 97 %

## 2021-02-08 DIAGNOSIS — R41.0 EPISODIC CONFUSION: Primary | ICD-10-CM

## 2021-02-08 PROCEDURE — 99213 OFFICE O/P EST LOW 20 MIN: CPT | Performed by: PSYCHIATRY & NEUROLOGY

## 2021-02-08 RX ORDER — DIVALPROEX SODIUM 500 MG/1
500 TABLET, EXTENDED RELEASE ORAL 2 TIMES DAILY
Qty: 180 TABLET | Refills: 3 | Status: SHIPPED | OUTPATIENT
Start: 2021-02-08

## 2021-02-08 NOTE — PROGRESS NOTES
Subjective:    CC: Mona Cordova is seen today for episodes of confusion    HPI:  Current visit-patient states she has not had any episodes of confusion since July.  She is tolerating Depakote  mg twice a day well without any adverse effects.  She had blood work recently including LFTs that were normal.  Unfortunately patient had Covid last month and was found at home minimally responsive by paramedics when she was not answering phone calls.  Her  had been admitted to the hospital with Covid a few days prior to that.  Patient got out of Covid rehab in January.  Still continues to be weak in her legs and is getting outpatient PT.  With regards to her A. fib she just turned in her loop recorder as she had been wearing it for the past 2 years.  Continues to be on aspirin and Eliquis for her atrial fibrillation.      Last visit-patient states that she has not had any of her usual episodes with confusion/hallucinations after starting Depakote ER now at 500 mg twice a day.  In fact her last episode was about 3 months ago in July.  She also denies having any side effects with Depakote.  Has been undergoing physical therapy for lower extremity weakness which she developed after her last episode.    Last episode-patient states that she again had 1 of her typical episodes 4 weeks ago.  Once again she denies being sick at the time but had been having nausea after getting dye for a nuclear scan.  Also remains chronically sleep deprived despite taking Seroquel 200 mg nightly.  She states that the episode started with her falling down from the bed after which she started to have numbness in the back of her head followed by a headache.  Then for 3 days she was extremely confused and started having hallucinations like seeing insects on the wall or a mouse waving a flag at her.  Her speech also seemed garbled and she needed help with her daily activities such as bathing because she was extremely weak.  Patient has mild  recollection of the events during that time.  Again  denies any shaking or staring off spells.  Her EEG that I had previously obtained showed bifrontal slowing but no epileptiform activity.  I also reviewed the results of her carotid Doppler which showed minimal stenosis bilaterally and echocardiogram that showed a normal EF mild left atrial dilatation with moderate aortic valve sclerosis but no PFO.  She continues to take aspirin 81 mg along with Eliquis 5 mg twice a day.    Initial dokoo-47-jisp-old female with a past medical history of paroxysmal atrial fibrillation, hypertension, hypothyroidism, stroke, hyperlipidemia, breast cancer status post chemotherapy (in remission since 1999), anxiety, depression presents with episodes of confusion.  Patient had a right caudate nucleus stroke in 2017 with  left-sided weakness.  She underwent a loop recorder placement after that and was found to have atrial flutter/A. fib and started on Eliquis along with aspirin 81 mg last year.  About 3 or 4 years ago she also started to have episodes which were thought to be TIAs.  During these she has a numbness in the back of her head followed by a headache.  After that she loses awareness and typically finds herself on the floor.  Also has slurred speech.  The confusion can last for days and she has visual hallucinations during the episode.  She has had about 6 such episodes with the last one being 3 weeks ago that was longest in duration and lasted for 4 days.  During that patient was at home and had the numbness as well as the headache.  After that she lost awareness.  Her  found her sitting on the floor, glassy eyed and she was trying to pull all the shoes off the shelf.  Had had urinary incontinence.  For the next 4 days she was confused with generalized weakness, slurring of speech and was seeing things including animals, people and bugs crawling on the walls.  She has no recollection of those days.   denies  any automatisms or shaking.  Ever since this episode she has had weakness in both her legs.  She denies being sick during any of the episodes but has chronic insomnia despite taking Seroquel 200 mg at night.  She also states that in the past taking 3 to 4 ounces of vodka with vitamin water followed by sleeping has helped her calm down and get over the episode.  She had a MRI brain 2 days ago that showed chronic ischemic changes as well as a remote right caudate lacunar infarct but no acute abnormalities.  Carotid Doppler, echo were also done the results of which are still pending.  Her blood work showed a white count of 11.1.  TSH was elevated at 13.6 and her Synthroid dose was adjusted.  Lipid panel was as follows-, , LDL 68, HDL 95.  Patient denies having febrile seizures as a child, family history of seizures or any concussions growing up.  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  There is a history of dementia in her paternal grandmother, aunt and uncle.    The following portions of the patient's history were reviewed today and updated as of 06/17/2020  : allergies, current medications, past family history, past medical history, past social history, past surgical history and problem list  These document will be scanned to patient's chart.      Current Outpatient Medications:   •  apixaban (Eliquis) 5 MG tablet tablet, Take 1 tablet by mouth 2 (Two) Times a Day., Disp: 180 tablet, Rfl: 1  •  aspirin 81 MG EC tablet, Take 1 tablet by mouth Daily., Disp: 90 tablet, Rfl: 3  •  CALCIUM PO, Take 600 mg by mouth Daily., Disp: , Rfl:   •  cetirizine (zyrTEC) 10 MG tablet, Take 10 mg by mouth Daily., Disp: , Rfl:   •  Cyanocobalamin (VITAMIN B-12 PO), Take 1 tablet by mouth Daily., Disp: , Rfl:   •  digoxin (LANOXIN) 250 MCG tablet, Daily., Disp: , Rfl:   •  divalproex (DEPAKOTE ER) 500 MG 24 hr tablet, Take 1 tablet by mouth 2 (two) times a day., Disp: 180 tablet, Rfl: 3  •   levothyroxine sodium (TIROSINT) 137 MCG capsule, Take 137 mcg by mouth Daily., Disp: , Rfl:   •  losartan-hydrochlorothiazide (HYZAAR) 100-25 MG per tablet, Take 1 tablet by mouth Daily., Disp: 90 tablet, Rfl: 1  •  metoprolol tartrate (LOPRESSOR) 50 MG tablet, Take 1 tablet by mouth 2 (Two) Times a Day., Disp: 180 tablet, Rfl: 1  •  Multiple Vitamin (MULTI-VITAMIN PO), Take 1 tablet by mouth Daily. ICAPS VITAMIN, Disp: , Rfl:   •  ondansetron ODT (ZOFRAN-ODT) 8 MG disintegrating tablet, prn, Disp: , Rfl:   •  pantoprazole (PROTONIX) 40 MG EC tablet, Take 40 mg by mouth 2 (Two) Times a Day., Disp: , Rfl:   •  prochlorperazine (COMPAZINE) 10 MG tablet, 10 mg Every 8 (Eight) Hours As Needed., Disp: , Rfl:   •  QUEtiapine (SEROquel) 200 MG tablet, Take 200 mg by mouth Every Night., Disp: , Rfl:   •  raloxifene (EVISTA) 60 MG tablet, Take 60 mg by mouth Daily., Disp: , Rfl:   •  sucralfate (CARAFATE) 1 g tablet, Take 1 g by mouth 2 (Two) Times a Day., Disp: , Rfl:   •  vilazodone (VIIBRYD) 40 MG tablet tablet, Take 40 mg by mouth Daily., Disp: , Rfl:   •  XIFAXAN 550 MG tablet, Take 550 mg by mouth As Needed., Disp: , Rfl:    Past Medical History:   Diagnosis Date   • Acid reflux    • Atrial fibrillation (CMS/HCC)    • Cancer (CMS/HCC)     BREAST   • Colitis    • Depression    • Depression    • Hypertension    • Stroke (CMS/HCC)    • Thyroid disorder    • TIA (transient ischemic attack)       Past Surgical History:   Procedure Laterality Date   • BREAST SURGERY      BREAST CANCER   • CERVICAL BIOPSY  W/ LOOP ELECTRODE EXCISION     • GASTRIC SLEEVE LAPAROSCOPIC     • HYSTERECTOMY     • REPLACEMENT TOTAL KNEE      BOTH   • VAGOTOMY        Family History   Problem Relation Age of Onset   • Emphysema Mother       Social History     Socioeconomic History   • Marital status:      Spouse name: Not on file   • Number of children: Not on file   • Years of education: Not on file   • Highest education level: Not on file  "  Tobacco Use   • Smoking status: Never Smoker   • Smokeless tobacco: Never Used   Substance and Sexual Activity   • Alcohol use: Yes     Frequency: Monthly or less     Comment: maria isabel   • Drug use: No   • Sexual activity: Defer     Review of Systems   Neurological: Positive for weakness.   All other systems reviewed and are negative.      Objective:    /84   Pulse 91   Temp 96.9 °F (36.1 °C)   Ht 175.3 cm (69\")   Wt 83.7 kg (184 lb 8 oz)   SpO2 97%   BMI 27.25 kg/m²     Neurology Exam:    General apperance: NAD.     Mental status: Alert, awake and oriented to time place and person.    Recent and Remote memory: Intact.  Delayed recall of 3/3 today    Attention span and Concentration: Normal.     Language and Speech: Intact- No dysarthria.    Fluency, Naming , Repitition and Comprehension:  Intact    Cranial Nerves:   CN II: Visual fields are full. Intact. Fundi - Normal, No papillederma, Pupils - RAHUL  CN III, IV and VI: Extraocular movements are intact. Normal saccades.   CN V: Facial sensation is intact.   CN VII: Muscles of facial expression reveal no asymmetry. Intact.   CN VIII: Hearing is intact. Whispered voice intact.   CN IX and X: Palate elevates symmetrically. Intact  CN XI: Shoulder shrug is intact.   CN XII: Tongue is midline without evidence of atrophy or fasciculation.     Ophthalmoscopic exam of optic disc-normal    Motor:  Right UE muscle strength 5/5. Normal tone.     Left UE muscle strength 5/5. Normal tone.      Right LE muscle strength5-/5. Normal tone.     Left LE muscle strength 5-/5. Normal tone.      Sensory: Normal light touch, vibration and pinprick sensation bilaterally.    DTRs: 2+ bilaterally in upper and lower extremities.    Babinski: Negative bilaterally.    Co-ordination: Normal finger-to-nose, heel to shin B/L.    Rhomberg: Negative.    Gait: Slow and cautious    Cardiovascular: Regular rate and rhythm without murmur, gallop or rub.    Assessment and Plan:  1. Episodic " confusion  Based on the semiology it is possible that the patient has complex focal seizures with a prolonged postictal phase versus stress-induced spells.  EEG showed bifrontal slowing but no epileptiform activity.  MRI brain did not show any acute abnormalities  I have asked her to continue Depakote  mg twice daily which helps with the spells and her mood  She should continue PT for her weakness/balance problems    2.  Atrial fibrillation  She is already on aspirin 81 mg along with Eliquis 5 mg twice a day.              Return in about 5 months (around 7/8/2021).         Benita Richter MD

## 2021-02-09 ENCOUNTER — IMMUNIZATION (OUTPATIENT)
Dept: VACCINE CLINIC | Facility: HOSPITAL | Age: 71
End: 2021-02-09

## 2021-02-09 PROCEDURE — 0002A: CPT | Performed by: INTERNAL MEDICINE

## 2021-02-09 PROCEDURE — 91300 HC SARSCOV02 VAC 30MCG/0.3ML IM: CPT | Performed by: INTERNAL MEDICINE

## 2021-02-10 ENCOUNTER — CLINICAL SUPPORT (OUTPATIENT)
Dept: CARDIOLOGY | Facility: CLINIC | Age: 71
End: 2021-02-10

## 2021-02-10 DIAGNOSIS — Z51.89 VISIT FOR WOUND CHECK: Primary | ICD-10-CM

## 2021-02-10 NOTE — PROGRESS NOTES
Mona Cordova  1950  2/10/2021   ?   Chief Complaint   Patient presents with   • Wound Check     Here for nurse visit wound check s/p loop explant      ?   HPI:   ?   ?Patient here for nurse visit wound check s/p loop recorder explant  To left upper breast area on 2-4-2021. Clear tegaderm with dressing underneath D&I, n o fresh drainage noted. Dressing removed, x 5 staples in place, wound edges well approximated, no s/s's of infection noted. Patient denies any fever. Will have Rafat Lee PAC in to assess site and orders for removal. PH,LPN  ?     Current Outpatient Medications:   •  apixaban (Eliquis) 5 MG tablet tablet, Take 1 tablet by mouth 2 (Two) Times a Day., Disp: 180 tablet, Rfl: 1  •  aspirin 81 MG EC tablet, Take 1 tablet by mouth Daily., Disp: 90 tablet, Rfl: 3  •  CALCIUM PO, Take 600 mg by mouth Daily., Disp: , Rfl:   •  cetirizine (zyrTEC) 10 MG tablet, Take 10 mg by mouth Daily., Disp: , Rfl:   •  Cyanocobalamin (VITAMIN B-12 PO), Take 1 tablet by mouth Daily., Disp: , Rfl:   •  digoxin (LANOXIN) 250 MCG tablet, Daily., Disp: , Rfl:   •  divalproex (DEPAKOTE ER) 500 MG 24 hr tablet, Take 1 tablet by mouth 2 (two) times a day., Disp: 180 tablet, Rfl: 3  •  levothyroxine sodium (TIROSINT) 137 MCG capsule, Take 137 mcg by mouth Daily., Disp: , Rfl:   •  losartan-hydrochlorothiazide (HYZAAR) 100-25 MG per tablet, Take 1 tablet by mouth Daily., Disp: 90 tablet, Rfl: 1  •  metoprolol tartrate (LOPRESSOR) 50 MG tablet, Take 1 tablet by mouth 2 (Two) Times a Day., Disp: 180 tablet, Rfl: 1  •  Multiple Vitamin (MULTI-VITAMIN PO), Take 1 tablet by mouth Daily. ICAPS VITAMIN, Disp: , Rfl:   •  ondansetron ODT (ZOFRAN-ODT) 8 MG disintegrating tablet, prn, Disp: , Rfl:   •  pantoprazole (PROTONIX) 40 MG EC tablet, Take 40 mg by mouth 2 (Two) Times a Day., Disp: , Rfl:   •  prochlorperazine (COMPAZINE) 10 MG tablet, 10 mg Every 8 (Eight) Hours As Needed., Disp: , Rfl:   •  QUEtiapine (SEROquel) 200 MG  tablet, Take 200 mg by mouth Every Night., Disp: , Rfl:   •  raloxifene (EVISTA) 60 MG tablet, Take 60 mg by mouth Daily., Disp: , Rfl:   •  sucralfate (CARAFATE) 1 g tablet, Take 1 g by mouth 2 (Two) Times a Day., Disp: , Rfl:   •  vilazodone (VIIBRYD) 40 MG tablet tablet, Take 40 mg by mouth Daily., Disp: , Rfl:   •  XIFAXAN 550 MG tablet, Take 550 mg by mouth As Needed., Disp: , Rfl:    ?   ?   Morphine       Procedures     ?   Assessment/Plan    ?   ? 1. Wound Check s/p loop explant    Rafat Lee, PAC in to see patient/site. V/O's received to remove staples and place steri strips for incision support. Every other staple removed, then all removed with no indication of dehiscence noted. Patient xavier. Well. X 3 steri strips applied over incision and strip care discussed. Patient aware to monitor site for any s/s's of infection and/or fever. Verbalized she understood. PH,LPN  ?

## 2021-02-16 DIAGNOSIS — I10 ESSENTIAL HYPERTENSION: ICD-10-CM

## 2021-02-17 RX ORDER — LOSARTAN POTASSIUM AND HYDROCHLOROTHIAZIDE 25; 100 MG/1; MG/1
TABLET ORAL
Qty: 90 TABLET | Refills: 3 | Status: SHIPPED | OUTPATIENT
Start: 2021-02-17

## 2021-02-17 RX ORDER — METOPROLOL TARTRATE 50 MG/1
TABLET, FILM COATED ORAL
Qty: 180 TABLET | Refills: 3 | Status: SHIPPED | OUTPATIENT
Start: 2021-02-17

## 2021-05-03 ENCOUNTER — TELEPHONE (OUTPATIENT)
Dept: CARDIOLOGY | Facility: CLINIC | Age: 71
End: 2021-05-03

## 2021-05-03 NOTE — TELEPHONE ENCOUNTER
Received Cardiac Clearance request from [facility] FOR COLONOSCOPY -- on XARELTO-HOLD [id number]  Scanned into clearance folder and put in box for ERICA GRANT.    Pat Rodas, RegSched Rep

## 2021-05-03 NOTE — TELEPHONE ENCOUNTER
V/O PER DR. JUNG, MILD PRE-OP RISK FOR EGD/COLONOSCOPY WITH DR. FLORES AND MAY HOLD XARELTO 2 DAYS PRIOR TO SURGERY. CARDIAC CLEARANCE LETTER FAXED TO THEIR OFFICE. PATIENCE GONZALEZ

## 2022-03-25 RX ORDER — DIVALPROEX SODIUM 500 MG/1
TABLET, EXTENDED RELEASE ORAL
Qty: 180 TABLET | Refills: 3 | OUTPATIENT
Start: 2022-03-25

## 2024-05-06 NOTE — PATIENT INSTRUCTIONS
I called patient with results, patient voiced understanding. Patient asked to call him back when order is ready to repeat ct scan.    Obesity, Adult  Obesity is the condition of having too much total body fat. Being overweight or obese means that your weight is greater than what is considered healthy for your body size. Obesity is determined by a measurement called BMI. BMI is an estimate of body fat and is calculated from height and weight. For adults, a BMI of 30 or higher is considered obese.  Obesity can lead to other health concerns and major illnesses, including:  · Stroke.  · Coronary artery disease (CAD).  · Type 2 diabetes.  · Some types of cancer, including cancers of the colon, breast, uterus, and gallbladder.  · Osteoarthritis.  · High blood pressure (hypertension).  · High cholesterol.  · Sleep apnea.  · Gallbladder stones.  · Infertility problems.  What are the causes?  Common causes of this condition include:  · Eating daily meals that are high in calories, sugar, and fat.  · Being born with genes that may make you more likely to become obese.  · Having a medical condition that causes obesity, including:  ? Hypothyroidism.  ? Polycystic ovarian syndrome (PCOS).  ? Binge-eating disorder.  ? Cushing syndrome.  · Taking certain medicines, such as steroids, antidepressants, and seizure medicines.  · Not being physically active (sedentary lifestyle).  · Not getting enough sleep.  · Drinking high amounts of sugar-sweetened beverages, such as soft drinks.  What increases the risk?  The following factors may make you more likely to develop this condition:  · Having a family history of obesity.  · Being a woman of  descent.  · Being a man of  descent.  · Living in an area with limited access to:  ? [place], recreation centers, or sidewalks.  ? Healthy food choices, such as grocery stores and farmers' markets.  What are the signs or symptoms?  The main sign of this condition is having too much body fat.  How is this diagnosed?  This condition is diagnosed based on:  · Your BMI. If you are an adult with a BMI of 30 or  higher, you are considered obese.  · Your waist circumference. This measures the distance around your waistline.  · Your skinfold thickness. Your health care provider may gently pinch a fold of your skin and measure it.  You may have other tests to check for underlying conditions.  How is this treated?  Treatment for this condition often includes changing your lifestyle. Treatment may include some or all of the following:  · Dietary changes. This may include developing a healthy meal plan.  · Regular physical activity. This may include activity that causes your heart to beat faster (aerobic exercise) and strength training. Work with your health care provider to design an exercise program that works for you.  · Medicine to help you lose weight if you are unable to lose 1 pound a week after 6 weeks of healthy eating and more physical activity.  · Treating conditions that cause the obesity (underlying conditions).  · Surgery. Surgical options may include gastric banding and gastric bypass. Surgery may be done if:  ? Other treatments have not helped to improve your condition.  ? You have a BMI of 40 or higher.  ? You have life-threatening health problems related to obesity.  Follow these instructions at home:  Eating and drinking    · Follow recommendations from your health care provider about what you eat and drink. Your health care provider may advise you to:  ? Limit fast food, sweets, and processed snack foods.  ? Choose low-fat options, such as low-fat milk instead of whole milk.  ? Eat 5 or more servings of fruits or vegetables every day.  ? Eat at home more often. This gives you more control over what you eat.  ? Choose healthy foods when you eat out.  ? Learn to read food labels. This will help you understand how much food is considered 1 serving.  ? Learn what a healthy serving size is.  ? Keep low-fat snacks available.  ? Limit sugary drinks, such as soda, fruit juice, sweetened iced tea, and flavored  milk.  · Drink enough water to keep your urine pale yellow.  · Do not follow a fad diet. Fad diets can be unhealthy and even dangerous.  Physical activity  · Exercise regularly, as told by your health care provider.  ? Most adults should get up to 150 minutes of moderate-intensity exercise every week.  ? Ask your health care provider what types of exercise are safe for you and how often you should exercise.  · Warm up and stretch before being active.  · Cool down and stretch after being active.  · Rest between periods of activity.  Lifestyle  · Work with your health care provider and a dietitian to set a weight-loss goal that is healthy and reasonable for you.  · Limit your screen time.  · Find ways to reward yourself that do not involve food.  · Do not drink alcohol if:  ? Your health care provider tells you not to drink.  ? You are pregnant, may be pregnant, or are planning to become pregnant.  · If you drink alcohol:  ? Limit how much you use to:  § 0-1 drink a day for women.  § 0-2 drinks a day for men.  ? Be aware of how much alcohol is in your drink. In the [place] one drink equals one 12 oz bottle of beer (355 mL), one 5 oz glass of wine (148 mL), or one 1½ oz glass of hard liquor (44 mL).  General instructions  · Keep a weight-loss journal to keep track of the food you eat and how much exercise you get.  · Take over-the-counter and prescription medicines only as told by your health care provider.  · Take vitamins and supplements only as told by your health care provider.  · Consider joining a support group. Your health care provider may be able to recommend a support group.  · Keep all follow-up visits as told by your health care provider. This is important.  Contact a health care provider if:  · You are unable to meet your weight loss goal after 6 weeks of dietary and lifestyle changes.  Get help right away if you are having:  · Trouble breathing.  · Suicidal thoughts or behaviors.  Summary  · Obesity is the  condition of having too much total body fat.  · Being overweight or obese means that your weight is greater than what is considered healthy for your body size.  · Work with your health care provider and a dietitian to set a weight-loss goal that is healthy and reasonable for you.  · Exercise regularly, as told by your health care provider. Ask your health care provider what types of exercise are safe for you and how often you should exercise.  This information is not intended to replace advice given to you by your health care provider. Make sure you discuss any questions you have with your health care provider.  Document Released: 01/25/2006 Document Revised: 08/22/2019 Document Reviewed: 08/22/2019  [employer] Patient Education © 2020 [employer]  MyPlate from [employer]    MyPlate is an outline of a general healthy diet based on the 2010 Dietary Guidelines for Americans, from the [place] Department of Agriculture (USDA). It sets guidelines for how much food you should eat from each food group based on your age, sex, and level of physical activity.  What are tips for following MyPlate?  To follow MyPlate recommendations:  · Eat a wide variety of fruits and vegetables, grains, and protein foods.  · Serve smaller portions and eat less food throughout the day.  · Limit portion sizes to avoid overeating.  · Enjoy your food.  · Get at least 150 minutes of exercise every week. This is about 30 minutes each day, 5 or more days per week.  It can be difficult to have every meal look like MyPlate. Think about MyPlate as eating guidelines for an entire day, rather than each individual meal.  Fruits and vegetables  · Make half of your plate fruits and vegetables.  · Eat many different colors of fruits and vegetables each day.  · For a 2,000 calorie daily food plan, eat:  ? 2½ cups of vegetables every day.  ? 2 cups of fruit every day.  · 1 cup is equal to:  ? 1 cup raw or cooked vegetables.  ? 1 cup raw fruit.  ? 1 medium-sized orange,  apple, or banana.  ? 1 cup 100% fruit or vegetable juice.  ? 2 cups raw leafy greens, such as lettuce, spinach, or kale.  ? ½ cup dried fruit.  Grains  · One fourth of your plate should be grains.  · Make at least half of the grains you eat each day whole grains.  · For a 2,000 calorie daily food plan, eat 6 oz of grains every day.  · 1 oz is equal to:  ? 1 slice bread.  ? 1 cup cereal.  ? ½ cup cooked rice, cereal, or pasta.  Protein  · One fourth of your plate should be protein.  · Eat a wide variety of protein foods, including meat, poultry, fish, eggs, beans, nuts, and tofu.  · For a 2,000 calorie daily food plan, eat 5½ oz of protein every day.  · 1 oz is equal to:  ? 1 oz meat, poultry, or fish.  ? ¼ cup cooked beans.  ? 1 egg.  ? ½ oz nuts or seeds.  ? 1 Tbsp peanut butter.  Dairy  · Drink fat-free or low-fat (1%) milk.  · Eat or drink dairy as a side to meals.  · For a 2,000 calorie daily food plan, eat or drink 3 cups of dairy every day.  · 1 cup is equal to:  ? 1 cup milk, yogurt, cottage cheese, or soy milk (soy beverage).  ? 2 oz processed cheese.  ? 1½ oz natural cheese.  Fats, oils, salt, and sugars  · Only small amounts of oils are recommended.  · Avoid foods that are high in calories and low in nutritional value (empty calories), like foods high in fat or added sugars.  · Choose foods that are low in salt (sodium). Choose foods that have less than 140 milligrams (mg) of sodium per serving.  · Drink water instead of sugary drinks. Drink enough water each day to keep your urine pale yellow.  Where to find support  · Work with your health care provider or a nutrition specialist (dietitian) to develop a customized eating plan that is right for you.  · Download an jcarlos (mobile application) to help you track your daily food intake.  Where to find more information  · Go to ChooseMyPlate.gov for more information.  · Learn more and log your daily food intake according to MyPlate using USDA's SuperTracker:  www.supertracker.usda.gov  Summary  · MyPlate is a general guideline for healthy eating from the USDA. It is based on the 2010 Dietary Guidelines for Americans.  · In general, fruits and vegetables should take up ½ of your plate, grains should take up ¼ of your plate, and protein should take up ¼ of your plate.  This information is not intended to replace advice given to you by your health care provider. Make sure you discuss any questions you have with your health care provider.  Document Released: 01/06/2009 Document Revised: 01/19/2019 Document Reviewed: 03/19/2018  [employer] Patient Education © 2020 [employer]